# Patient Record
Sex: MALE | Race: WHITE | NOT HISPANIC OR LATINO | Employment: FULL TIME | URBAN - METROPOLITAN AREA
[De-identification: names, ages, dates, MRNs, and addresses within clinical notes are randomized per-mention and may not be internally consistent; named-entity substitution may affect disease eponyms.]

---

## 2019-05-21 ENCOUNTER — TELEPHONE (OUTPATIENT)
Dept: INTERNAL MEDICINE | Facility: CLINIC | Age: 54
End: 2019-05-21

## 2019-05-21 RX ORDER — LOSARTAN POTASSIUM 100 MG/1
100 TABLET ORAL DAILY
Qty: 90 TABLET | Refills: 0 | Status: SHIPPED | OUTPATIENT
Start: 2019-05-21 | End: 2019-08-27 | Stop reason: SDUPTHER

## 2019-05-21 RX ORDER — LOSARTAN POTASSIUM 100 MG/1
100 TABLET ORAL DAILY
COMMUNITY
End: 2019-05-21 | Stop reason: SDUPTHER

## 2019-06-17 RX ORDER — FENOFIBRATE 145 MG/1
145 TABLET, COATED ORAL DAILY
Qty: 30 TABLET | Refills: 0 | Status: SHIPPED | OUTPATIENT
Start: 2019-06-17 | End: 2020-02-03 | Stop reason: SDUPTHER

## 2019-06-17 RX ORDER — FENOFIBRATE 145 MG/1
145 TABLET, COATED ORAL DAILY
COMMUNITY
End: 2019-06-17 | Stop reason: SDUPTHER

## 2019-06-17 RX ORDER — FENOFIBRATE 145 MG/1
145 TABLET, COATED ORAL DAILY
Qty: 90 TABLET | Refills: 1 | Status: SHIPPED | OUTPATIENT
Start: 2019-06-17 | End: 2019-06-17 | Stop reason: SDUPTHER

## 2019-07-10 RX ORDER — SIMVASTATIN 40 MG
TABLET ORAL
Qty: 90 TABLET | Refills: 0 | Status: SHIPPED | OUTPATIENT
Start: 2019-07-10 | End: 2019-10-10 | Stop reason: SDUPTHER

## 2019-07-12 ENCOUNTER — OFFICE VISIT (OUTPATIENT)
Dept: INTERNAL MEDICINE | Facility: CLINIC | Age: 54
End: 2019-07-12

## 2019-07-12 VITALS
DIASTOLIC BLOOD PRESSURE: 84 MMHG | HEIGHT: 68 IN | SYSTOLIC BLOOD PRESSURE: 114 MMHG | RESPIRATION RATE: 20 BRPM | HEART RATE: 83 BPM | WEIGHT: 238.6 LBS | OXYGEN SATURATION: 98 % | BODY MASS INDEX: 36.16 KG/M2

## 2019-07-12 DIAGNOSIS — G47.33 OBSTRUCTIVE SLEEP APNEA: ICD-10-CM

## 2019-07-12 DIAGNOSIS — Z12.5 PROSTATE CANCER SCREENING: ICD-10-CM

## 2019-07-12 DIAGNOSIS — E78.00 HYPERCHOLESTEROLEMIA: ICD-10-CM

## 2019-07-12 DIAGNOSIS — R07.89 OTHER CHEST PAIN: ICD-10-CM

## 2019-07-12 DIAGNOSIS — I10 ESSENTIAL HYPERTENSION: Primary | ICD-10-CM

## 2019-07-12 DIAGNOSIS — R73.9 HYPERGLYCEMIA: ICD-10-CM

## 2019-07-12 PROCEDURE — 93000 ELECTROCARDIOGRAM COMPLETE: CPT | Performed by: INTERNAL MEDICINE

## 2019-07-12 PROCEDURE — 99214 OFFICE O/P EST MOD 30 MIN: CPT | Performed by: INTERNAL MEDICINE

## 2019-07-12 RX ORDER — OMEPRAZOLE 20 MG/1
20 CAPSULE, DELAYED RELEASE ORAL DAILY
COMMUNITY
End: 2019-12-10 | Stop reason: SDUPTHER

## 2019-07-12 RX ORDER — CHLORAL HYDRATE 500 MG
CAPSULE ORAL
COMMUNITY

## 2019-07-12 NOTE — PROGRESS NOTES
Subjective        Chief Complaint   Patient presents with   • Follow-up     6 month fup   • Hyperlipidemia   • Hypertension           Kings Mera is a 53 y.o. male who presents for    Patient Active Problem List   Diagnosis   • Hypertension   • Obstructive sleep apnea   • Hypercholesterolemia   • Hyperglycemia   • Other chest pain       History of Present Illness     He exercises 3 miles 3 days and 6 miles the other 3. He will have muscle soreness in his upper chest after he exercises. He feels fine the next day. He has dyspnea that is improving. He is not wheezing or coughing. His BP has been 136//88. His weight fluctuates. He uses a CPAP machine nightly. He had an EGD earlier this year and was told to have it repeated in 3 years.   No Known Allergies    Current Outpatient Medications on File Prior to Visit   Medication Sig Dispense Refill   • fenofibrate (TRICOR) 145 MG tablet Take 1 tablet by mouth Daily. 30 tablet 0   • losartan (COZAAR) 100 MG tablet Take 1 tablet by mouth Daily. 90 tablet 0   • Omega-3 Fatty Acids (FISH OIL) 1000 MG capsule capsule Take  by mouth Daily With Breakfast.     • omeprazole (priLOSEC) 20 MG capsule Take 20 mg by mouth Daily.     • simvastatin (ZOCOR) 40 MG tablet TAKE ONE TABLET BY MOUTH DAILY 90 tablet 0     No current facility-administered medications on file prior to visit.        Past Medical History:   Diagnosis Date   • Alopecia areata    • Back esophagus    • Diverticulitis    • Fatty liver    • Gastroesophageal reflux    • Hypercholesterolemia    • Hyperglycemia    • Hypertension    • Obesity    • Obstructive sleep apnea    • Tinea versicolor    • Tinnitus        Past Surgical History:   Procedure Laterality Date   • COLONOSCOPY  2015   • UPPER GASTROINTESTINAL ENDOSCOPY  2019       Family History   Problem Relation Age of Onset   • No Known Problems Mother    • Other Father         head injury       Social History     Socioeconomic History   • Marital status:  "     Spouse name: Not on file   • Number of children: Not on file   • Years of education: Not on file   • Highest education level: Not on file   Tobacco Use   • Smoking status: Former Smoker     Packs/day: 1.00     Years: 4.00     Pack years: 4.00     Types: Cigarettes     Last attempt to quit:      Years since quittin.5   • Smokeless tobacco: Never Used   Substance and Sexual Activity   • Alcohol use: Yes     Frequency: 2-3 times a week     Drinks per session: 5 or 6     Comment: Social - 12-18 beer per week   • Drug use: No   • Sexual activity: Defer           The following portions of the patient's history were reviewed and updated as appropriate: problem list, allergies, current medications, past medical history, past family history, past social history and past surgical history.    Review of Systems   Respiratory: Negative for shortness of breath.    Cardiovascular: Positive for chest pain.       Immunization History   Administered Date(s) Administered   • Hepatitis A 11/10/2017, 2019   • Hepatitis B 11/10/2017, 2017, 2019   • Tdap 2010   • Zostavax 2014       Objective   Vitals:    19 1049   BP: 114/84   Pulse: 83   Resp: 20   SpO2: 98%   Weight: 108 kg (238 lb 9.6 oz)   Height: 172.7 cm (68\")     Physical Exam   Constitutional: He appears well-developed and well-nourished.   HENT:   Head: Normocephalic and atraumatic.   Cardiovascular: Normal rate, regular rhythm, S1 normal, S2 normal and normal heart sounds.   Pulmonary/Chest: Effort normal and breath sounds normal.   Neurological: He is alert.   Skin: Skin is warm.   Psychiatric: He has a normal mood and affect.   Vitals reviewed.        ECG 12 Lead  Date/Time: 2019 11:35 AM  Performed by: Moncho Gonzalez MD  Authorized by: Moncho Gonzalez MD   Comparison: not compared with previous ECG   Rhythm: sinus rhythm  Rate: normal  Conduction: conduction normal  ST Segments: ST segments normal  QRS " axis: normal    Clinical impression: normal ECG            Assessment/Plan   Kings was seen today for follow-up, hyperlipidemia and hypertension.    Diagnoses and all orders for this visit:    Essential hypertension  -     Comprehensive Metabolic Panel; Future  -     CBC & Differential; Future    Hypercholesterolemia  -     Lipid Panel With / Chol / HDL Ratio; Future    Obstructive sleep apnea    Hyperglycemia  -     Hemoglobin A1c; Future    Other chest pain    Prostate cancer screening  -     PSA Screen; Future    Other orders  -     ECG 12 Lead      EKG is normal. He wants to wait on a stress test that I offered to set up. BP is good. Order given for new CPAP machine.            Return in about 4 months (around 11/12/2019).

## 2019-08-27 RX ORDER — LOSARTAN POTASSIUM 100 MG/1
100 TABLET ORAL DAILY
Qty: 90 TABLET | Refills: 1 | Status: SHIPPED | OUTPATIENT
Start: 2019-08-27 | End: 2020-02-03 | Stop reason: SDUPTHER

## 2019-10-10 RX ORDER — SIMVASTATIN 40 MG
TABLET ORAL
Qty: 90 TABLET | Refills: 0 | Status: SHIPPED | OUTPATIENT
Start: 2019-10-10 | End: 2020-01-08

## 2019-11-07 ENCOUNTER — RESULTS ENCOUNTER (OUTPATIENT)
Dept: INTERNAL MEDICINE | Facility: CLINIC | Age: 54
End: 2019-11-07

## 2019-11-07 DIAGNOSIS — I10 ESSENTIAL HYPERTENSION: ICD-10-CM

## 2019-11-07 DIAGNOSIS — E78.00 HYPERCHOLESTEROLEMIA: ICD-10-CM

## 2019-11-07 DIAGNOSIS — Z12.5 PROSTATE CANCER SCREENING: ICD-10-CM

## 2019-11-07 DIAGNOSIS — R73.9 HYPERGLYCEMIA: ICD-10-CM

## 2019-11-22 ENCOUNTER — OFFICE VISIT (OUTPATIENT)
Dept: INTERNAL MEDICINE | Facility: CLINIC | Age: 54
End: 2019-11-22

## 2019-11-22 VITALS
SYSTOLIC BLOOD PRESSURE: 180 MMHG | DIASTOLIC BLOOD PRESSURE: 90 MMHG | BODY MASS INDEX: 35.84 KG/M2 | HEIGHT: 69 IN | WEIGHT: 242 LBS

## 2019-11-22 DIAGNOSIS — I10 ESSENTIAL HYPERTENSION: ICD-10-CM

## 2019-11-22 DIAGNOSIS — R20.2 NUMBNESS AND TINGLING IN LEFT HAND: ICD-10-CM

## 2019-11-22 DIAGNOSIS — R20.0 NUMBNESS AND TINGLING IN LEFT HAND: ICD-10-CM

## 2019-11-22 DIAGNOSIS — G47.33 OBSTRUCTIVE SLEEP APNEA: Primary | ICD-10-CM

## 2019-11-22 DIAGNOSIS — E78.00 HYPERCHOLESTEROLEMIA: ICD-10-CM

## 2019-11-22 LAB
ALBUMIN SERPL-MCNC: 4.9 G/DL (ref 3.5–5.2)
ALBUMIN/GLOB SERPL: 2 G/DL
ALP SERPL-CCNC: 41 U/L (ref 39–117)
ALT SERPL-CCNC: 43 U/L (ref 1–41)
AST SERPL-CCNC: 41 U/L (ref 1–40)
BASOPHILS # BLD AUTO: 0.06 10*3/MM3 (ref 0–0.2)
BASOPHILS NFR BLD AUTO: 0.9 % (ref 0–1.5)
BILIRUB SERPL-MCNC: 0.7 MG/DL (ref 0.2–1.2)
BUN SERPL-MCNC: 15 MG/DL (ref 6–20)
BUN/CREAT SERPL: 14.4 (ref 7–25)
CALCIUM SERPL-MCNC: 9.4 MG/DL (ref 8.6–10.5)
CHLORIDE SERPL-SCNC: 102 MMOL/L (ref 98–107)
CHOLEST SERPL-MCNC: 179 MG/DL (ref 0–200)
CHOLEST/HDLC SERPL: 4.07 {RATIO}
CO2 SERPL-SCNC: 27 MMOL/L (ref 22–29)
CREAT SERPL-MCNC: 1.04 MG/DL (ref 0.76–1.27)
EOSINOPHIL # BLD AUTO: 0.18 10*3/MM3 (ref 0–0.4)
EOSINOPHIL NFR BLD AUTO: 2.8 % (ref 0.3–6.2)
ERYTHROCYTE [DISTWIDTH] IN BLOOD BY AUTOMATED COUNT: 11.7 % (ref 12.3–15.4)
GLOBULIN SER CALC-MCNC: 2.5 GM/DL
GLUCOSE SERPL-MCNC: 103 MG/DL (ref 65–99)
HBA1C MFR BLD: 5.6 % (ref 4.8–5.6)
HCT VFR BLD AUTO: 42.6 % (ref 37.5–51)
HDLC SERPL-MCNC: 44 MG/DL (ref 40–60)
HGB BLD-MCNC: 14.7 G/DL (ref 13–17.7)
IMM GRANULOCYTES # BLD AUTO: 0.02 10*3/MM3 (ref 0–0.05)
IMM GRANULOCYTES NFR BLD AUTO: 0.3 % (ref 0–0.5)
LDLC SERPL CALC-MCNC: 112 MG/DL (ref 0–100)
LYMPHOCYTES # BLD AUTO: 2.11 10*3/MM3 (ref 0.7–3.1)
LYMPHOCYTES NFR BLD AUTO: 32.7 % (ref 19.6–45.3)
MCH RBC QN AUTO: 30 PG (ref 26.6–33)
MCHC RBC AUTO-ENTMCNC: 34.5 G/DL (ref 31.5–35.7)
MCV RBC AUTO: 86.9 FL (ref 79–97)
MONOCYTES # BLD AUTO: 0.6 10*3/MM3 (ref 0.1–0.9)
MONOCYTES NFR BLD AUTO: 9.3 % (ref 5–12)
NEUTROPHILS # BLD AUTO: 3.49 10*3/MM3 (ref 1.7–7)
NEUTROPHILS NFR BLD AUTO: 54 % (ref 42.7–76)
NRBC BLD AUTO-RTO: 0 /100 WBC (ref 0–0.2)
PLATELET # BLD AUTO: 271 10*3/MM3 (ref 140–450)
POTASSIUM SERPL-SCNC: 4.3 MMOL/L (ref 3.5–5.2)
PROT SERPL-MCNC: 7.4 G/DL (ref 6–8.5)
PSA SERPL-MCNC: 0.55 NG/ML (ref 0–4)
RBC # BLD AUTO: 4.9 10*6/MM3 (ref 4.14–5.8)
SODIUM SERPL-SCNC: 144 MMOL/L (ref 136–145)
TRIGL SERPL-MCNC: 117 MG/DL (ref 0–150)
VLDLC SERPL CALC-MCNC: 23.4 MG/DL
WBC # BLD AUTO: 6.46 10*3/MM3 (ref 3.4–10.8)

## 2019-11-22 PROCEDURE — 99214 OFFICE O/P EST MOD 30 MIN: CPT | Performed by: INTERNAL MEDICINE

## 2019-11-22 RX ORDER — AMLODIPINE BESYLATE 5 MG/1
5 TABLET ORAL DAILY
Qty: 30 TABLET | Refills: 2 | Status: SHIPPED | OUTPATIENT
Start: 2019-11-22 | End: 2020-02-03

## 2019-11-22 NOTE — PROGRESS NOTES
Subjective        Chief Complaint   Patient presents with   • Hyperlipidemia     4 month fup htn tingling in arms c/o diverticulitis    • Hypertension           Kings Mera is a 53 y.o. male who presents for    Patient Active Problem List   Diagnosis   • Hypertension   • Obstructive sleep apnea   • Hypercholesterolemia   • Hyperglycemia   • Numbness and tingling in left hand       History of Present Illness     He checks his BP and it has been up to 152/80. He ate a bunch of peanuts in the end of the summer and he hurt in his lower abdomen for 2 days. He denies melena or hematochezia. As long as he takes [employer] then he does not have pain. He has tingling in his left shoulder and left 4th and 5th fingers for the last 2 months. It is worse if he puts the arm on an arm rest. He is not having neck pain. He denies chest pain or dyspnea. He uses his CPAP nightly; his machine is old. He has been drinking a case of beer per week with stress from work. He is not depressed; he cannot sleep b/c he thinks about work.  No Known Allergies    Current Outpatient Medications on File Prior to Visit   Medication Sig Dispense Refill   • fenofibrate (TRICOR) 145 MG tablet Take 1 tablet by mouth Daily. 30 tablet 0   • losartan (COZAAR) 100 MG tablet Take 1 tablet by mouth Daily. 90 tablet 1   • Omega-3 Fatty Acids (FISH OIL) 1000 MG capsule capsule Take  by mouth Daily With Breakfast.     • omeprazole (priLOSEC) 20 MG capsule Take 20 mg by mouth Daily.     • simvastatin (ZOCOR) 40 MG tablet TAKE ONE TABLET BY MOUTH DAILY 90 tablet 0     No current facility-administered medications on file prior to visit.        Past Medical History:   Diagnosis Date   • Alopecia areata    • Back esophagus    • Diverticulitis    • Fatty liver    • Gastroesophageal reflux    • Hypercholesterolemia    • Hyperglycemia    • Hypertension    • Obesity    • Obstructive sleep apnea    • Tinea versicolor    • Tinnitus        Past Surgical History:  "  Procedure Laterality Date   • COLONOSCOPY     • UPPER GASTROINTESTINAL ENDOSCOPY  2019       Family History   Problem Relation Age of Onset   • No Known Problems Mother    • Other Father         head injury       Social History     Socioeconomic History   • Marital status:      Spouse name: Not on file   • Number of children: Not on file   • Years of education: Not on file   • Highest education level: Not on file   Tobacco Use   • Smoking status: Former Smoker     Packs/day: 1.00     Years: 4.00     Pack years: 4.00     Types: Cigarettes     Last attempt to quit: 1990     Years since quittin.9   • Smokeless tobacco: Never Used   Substance and Sexual Activity   • Alcohol use: Yes     Frequency: 2-3 times a week     Drinks per session: 5 or 6     Comment: Social - 12-18 beer per week   • Drug use: No   • Sexual activity: Defer           The following portions of the patient's history were reviewed and updated as appropriate: problem list, allergies, current medications, past medical history, past family history, past social history and past surgical history.    Review of Systems   Respiratory: Negative for shortness of breath.    Cardiovascular: Negative for chest pain.       Immunization History   Administered Date(s) Administered   • Hepatitis A 11/10/2017, 2019   • Hepatitis B 11/10/2017, 2017, 2019   • Tdap 2010   • Zostavax 2014       Objective   Vitals:    19 0909   BP: 180/90   Weight: 110 kg (242 lb)   Height: 175.3 cm (69\")     Body mass index is 35.74 kg/m².  Physical Exam   Constitutional: He appears well-developed and well-nourished.   HENT:   Head: Normocephalic and atraumatic.   Cardiovascular: Normal rate, regular rhythm, S1 normal, S2 normal and normal heart sounds.   Pulmonary/Chest: Effort normal and breath sounds normal.   Neurological: He is alert.   Reflex Scores:       Bicep reflexes are 2+ on the right side and 2+ on the left side.       " Brachioradialis reflexes are 2+ on the right side and 2+ on the left side.  5/5 strength in arms    Left shoulder good ROM   Skin: Skin is warm.   Psychiatric: He has a normal mood and affect.   Vitals reviewed.      Procedures    Assessment/Plan   Kings was seen today for hyperlipidemia and hypertension.    Diagnoses and all orders for this visit:    Obstructive sleep apnea  -     Ambulatory Referral to Sleep Medicine    Essential hypertension  -     amLODIPine (NORVASC) 5 MG tablet; Take 1 tablet by mouth Daily.    Numbness and tingling in left hand  -     Ambulatory Referral to Physical Therapy Evaluate and treat    Hypercholesterolemia               Labs today including cmp, flp, and psa. Add Norvasc for HTN; discussed risk of muscle aches with Zocor. Set up PT for left arm; sounds like potential pinched nerve. Discussed melatonin for sleep and decreasing beer. Get in with sleep medicine b/c his machine is old and he has not seen a sleep doctor recently.  Return in about 6 weeks (around 1/3/2020).

## 2019-12-06 ENCOUNTER — TREATMENT (OUTPATIENT)
Dept: PHYSICAL THERAPY | Facility: CLINIC | Age: 54
End: 2019-12-06

## 2019-12-06 DIAGNOSIS — M54.2 PAIN, NECK: Primary | ICD-10-CM

## 2019-12-06 DIAGNOSIS — M54.12 RADICULOPATHY, CERVICAL: ICD-10-CM

## 2019-12-06 PROCEDURE — 97162 PT EVAL MOD COMPLEX 30 MIN: CPT | Performed by: PHYSICAL THERAPIST

## 2019-12-06 PROCEDURE — 97110 THERAPEUTIC EXERCISES: CPT | Performed by: PHYSICAL THERAPIST

## 2019-12-06 NOTE — PROGRESS NOTES
Physical Therapy Initial Evaluation and Plan of Care    Patient: Kings Mera   : 1965  Diagnosis/ICD-10 Code:  Pain, neck [M54.2]  Referring practitioner: Moncho Gonzalez MD  Past Medical History Reviewed: 2019    PLOF: Independent and working full time as     Subjective Evaluation    History of Present Illness  Date of onset: 10/24/2019  Mechanism of injury: I am having some tingling in the left shoulder and the last 2 fingers. I do not have any neck pain. I woke up one morning and my hand was tingling and then my shoulder tinglings too. Lying on the right side makes my left arm go numb and putting the arm behind my back makes it better. Riding my bike makes it tingle more. I like to golf, but that does not bother it   Have not tried heat or ice. No pain is associated with this just reaching up makes it tingle.  I feel like it is more the position of my arm than my neck.   I have never had this happen before.            Patient Occupation:  Pain  No pain reported  Alleviating factors: putting shoulder behind back.  Aggravating factors: outstretched reach (at rest)  Progression: no change    Hand dominance: right    Diagnostic Tests  No diagnostic tests performed             Objective       Palpation   Left   Tenderness of the cervical paraspinals, middle trapezius, pectoralis minor and suboccipitals.     Active Range of Motion   Cervical/Thoracic Spine   Cervical    Flexion: WFL  Extension: WFL and with pain  Left lateral flexion: WFL  Right lateral flexion: WFL and with pain  Left rotation: 65 degrees   Right rotation: 70 degrees     Strength/Myotome Testing   Cervical Spine     Left   Normal strength    Right   Normal strength    Tests   Cervical     Left   Positive Spurling's sign.          Assessment & Plan     Assessment  Impairments: activity intolerance and pain with function  Assessment details: Pt presents to PT with symptoms consistent with mild cervical  radiculopathy, cervical tightness and postural deficits.  Pt would benefit from skilled PT intervention to address the deficits noted.     Prognosis: good  Functional Limitations: uncomfortable because of pain, sitting, reaching overhead and unable to perform repetitive tasks  Goals  Plan Goals: SHORT TERM GOALS: 4-6 visits  1. Pt will be compliant with HEP.  2. Pt to exhibit 75 degrees of cervical rotation to allow for viewing traffic without pain or limitations  3. Pt to report ability to sleep through the night without awakening  4. Pt will have minimal-no reported radicular sx's into LUE    LONG TERM GOALS: 8-10 visits  1.  Pt to score <10% perceived disability on QuickDASH  2.  Pain level < 3/10 at worse with driving > 60 min. and ADL's  3.  Pt will report minimal-no pain with palpation of cervical and thoracic musculature  4.  Pt able to job requirements and cleaning  activities without complaints of pain limiting function.     Plan  Therapy options: will be seen for skilled physical therapy services  Planned modality interventions: cryotherapy, electrical stimulation/Russian stimulation, iontophoresis, TENS, thermotherapy (hydrocollator packs), traction and ultrasound  Other planned modality interventions: Dry Needling  Planned therapy interventions: abdominal trunk stabilization, ADL retraining, body mechanics training, flexibility, functional ROM exercises, home exercise program, joint mobilization, manual therapy, neuromuscular re-education, postural training, soft tissue mobilization, spinal/joint mobilization, strengthening, stretching and therapeutic activities  Duration in visits: 10  Treatment plan discussed with: patient        Manual Therapy:    -     mins  63902;  Therapeutic Exercise:    10     mins  88320;     Neuromuscular Gemma:    -    mins  44937;    Therapeutic Activity:     -     mins  90142;     Gait Training:      -     mins  12029;     Ultrasound:     -     mins  13698;    Electrical  Stimulation:    -     mins  79269 ( );  Dry Needling     -     mins self-pay    Timed Treatment:   10   mins   Total Treatment:     60   mins      PT SIGNATURE: Silvina Christian, PT   DATE TREATMENT INITIATED: 12/6/2019    Initial Certification  Certification Period: 3/5/2020  I certify that the therapy services are furnished while this patient is under my care.  The services outlined above are required by this patient, and will be reviewed every 90 days.     PHYSICIAN: Moncho Gonzalez MD      DATE:     Please sign and return via fax to [phone].. Thank you, [facility].

## 2019-12-10 RX ORDER — OMEPRAZOLE 20 MG/1
CAPSULE, DELAYED RELEASE ORAL
Qty: 30 CAPSULE | Refills: 9 | Status: SHIPPED | OUTPATIENT
Start: 2019-12-10 | End: 2020-10-12

## 2019-12-11 ENCOUNTER — TREATMENT (OUTPATIENT)
Dept: PHYSICAL THERAPY | Facility: CLINIC | Age: 54
End: 2019-12-11

## 2019-12-11 DIAGNOSIS — M54.2 PAIN, NECK: Primary | ICD-10-CM

## 2019-12-11 DIAGNOSIS — M54.12 RADICULOPATHY, CERVICAL: ICD-10-CM

## 2019-12-11 PROCEDURE — 97035 APP MDLTY 1+ULTRASOUND EA 15: CPT | Performed by: PHYSICAL THERAPIST

## 2019-12-11 PROCEDURE — 97110 THERAPEUTIC EXERCISES: CPT | Performed by: PHYSICAL THERAPIST

## 2019-12-11 NOTE — PROGRESS NOTES
Physical Therapy Daily Progress Note  Visit: 2    Kings Mera reports: I feel about the same, maybe a little different    Subjective     Objective   See Exercise, Manual, and Modality Logs for complete treatment.       Assessment & Plan     Assessment  Assessment details: Pt responded very well to treatment today. After ultrasound he did not have any radicular sx's with exercise. Added rows, supine thoracic extension and doorway stretch to HEP    Plan  Plan details: Add shoulder extension and chin tucks with ball next session        Manual Therapy:    4     mins  46018;  Therapeutic Exercise:    31     mins  39591;     Neuromuscular Gemma:    -    mins  58704;    Therapeutic Activity:     -     mins  12451;     Gait Training:      -     mins  01041;     Ultrasound:     10     mins  72006;    Electrical Stimulation:    -     mins  20040 ( );  Dry Needling     -     mins self-pay    Timed Treatment:   45   mins   Total Treatment:     45   mins    Silvina Christian PT  [place] License #: [id number]    Physical Therapist

## 2019-12-18 ENCOUNTER — TREATMENT (OUTPATIENT)
Dept: PHYSICAL THERAPY | Facility: CLINIC | Age: 54
End: 2019-12-18

## 2019-12-18 DIAGNOSIS — M54.12 RADICULOPATHY, CERVICAL: ICD-10-CM

## 2019-12-18 DIAGNOSIS — M54.2 PAIN, NECK: Primary | ICD-10-CM

## 2019-12-18 PROCEDURE — 97035 APP MDLTY 1+ULTRASOUND EA 15: CPT | Performed by: PHYSICAL THERAPIST

## 2019-12-18 PROCEDURE — 97110 THERAPEUTIC EXERCISES: CPT | Performed by: PHYSICAL THERAPIST

## 2019-12-18 NOTE — PROGRESS NOTES
Physical Therapy Daily Progress Note  Visit: 3    Kings Mera reports: I am actually doing a lot better    Subjective     Objective   See Exercise, Manual, and Modality Logs for complete treatment.       Assessment & Plan     Assessment  Assessment details: Pt doing well. No reported radicular pain with exercise. Educated to continue stretching at home    Plan  Plan details: Progress per POC        Manual Therapy:    5     mins  15250;  Therapeutic Exercise:    30     mins  88955;     Neuromuscular Gemma:    -    mins  99153;    Therapeutic Activity:     -     mins  65105;     Gait Training:      -     mins  59471;     Ultrasound:     10     mins  19161;    Electrical Stimulation:    -     mins  79893 ( );  Dry Needling     -     mins self-pay    Timed Treatment:   45   mins   Total Treatment:     48   mins    Silvina Christian PT  [place] License #: [id number]    Physical Therapist

## 2019-12-19 ENCOUNTER — OFFICE VISIT (OUTPATIENT)
Dept: SLEEP MEDICINE | Facility: HOSPITAL | Age: 54
End: 2019-12-19

## 2019-12-19 VITALS
WEIGHT: 239 LBS | BODY MASS INDEX: 35.4 KG/M2 | SYSTOLIC BLOOD PRESSURE: 163 MMHG | OXYGEN SATURATION: 97 % | DIASTOLIC BLOOD PRESSURE: 88 MMHG | HEIGHT: 69 IN | HEART RATE: 92 BPM

## 2019-12-19 DIAGNOSIS — G47.33 OBSTRUCTIVE SLEEP APNEA: Primary | ICD-10-CM

## 2019-12-19 DIAGNOSIS — E66.09 CLASS 2 OBESITY DUE TO EXCESS CALORIES WITHOUT SERIOUS COMORBIDITY WITH BODY MASS INDEX (BMI) OF 36.0 TO 36.9 IN ADULT: ICD-10-CM

## 2019-12-19 DIAGNOSIS — R06.83 SNORING: ICD-10-CM

## 2019-12-19 PROBLEM — E66.812 CLASS 2 OBESITY DUE TO EXCESS CALORIES WITHOUT SERIOUS COMORBIDITY WITH BODY MASS INDEX (BMI) OF 36.0 TO 36.9 IN ADULT: Status: ACTIVE | Noted: 2019-12-19

## 2019-12-19 PROCEDURE — G0463 HOSPITAL OUTPT CLINIC VISIT: HCPCS

## 2019-12-19 PROCEDURE — 99244 OFF/OP CNSLTJ NEW/EST MOD 40: CPT | Performed by: INTERNAL MEDICINE

## 2019-12-19 NOTE — PROGRESS NOTES
[facility]  [address]  [address] [place] [address]  Phone   Fax       Kings Mera  1965  53 y.o.  male      Referring Provider, and PCP:Moncho Gonzalez MD    Type of service: Initial consult  Date of service: 12/19/2019      Chief Complaint   Patient presents with   • Sleep Apnea   • Snoring   • Fatigue       History of present illness;  Thank you for asking to see Kings Mera, 53 y.o.  for evaluation of sleep apnea. The patient was seen today on 12/19/2019 at [facility].   Patient has a history of sleep apnea has been using CPAP for the past more than 10 years but he is CPAP is very old and has no smartcard.  He also has put on about 20 pounds in weight.  His symptoms are getting worse and he says that he is still snores and has daytime excessive sleepiness.  It used to help him in the past and now in the past 6 months his symptoms are getting worse    Patient gives the following sleep history.  Sleep schedule:  Bedtime: 10 PM  Wake time: 5 AM  Normally takes about 30 minutes to fall asleep  Average hours of sleep 6  Number of naps per day no  When patient wakes up still feels tired and not rested  Snoring; yes  Witnessed apneas; yes  Have you ever awakened gasping for breath, coughing, choking: Yes      Past Medical History:   Diagnosis Date   • Alopecia areata    • Back esophagus    • Diverticulitis    • Fatty liver    • Gastroesophageal reflux    • Hypercholesterolemia    • Hyperglycemia    • Hypertension    • Obesity    • Obstructive sleep apnea    • Tinea versicolor    • Tinnitus        Social history:  Shift work: No  Tobacco use: Smoker  Alcohol use: 1-2 drinks a day  Caffeinated drinks: 1 cup of coffee  Over-the-counter sleeping aid and medications: No  Narcotic medications: No    Family Hx  Family history of sleep apnea, no family history of sleep apnea  Family History   Problem Relation Age of Onset   • No Known  "Problems Mother    • Other Father         head injury       Medications: reviewed    Current Outpatient Medications:   •  amLODIPine (NORVASC) 5 MG tablet, Take 1 tablet by mouth Daily., Disp: 30 tablet, Rfl: 2  •  fenofibrate (TRICOR) 145 MG tablet, Take 1 tablet by mouth Daily., Disp: 30 tablet, Rfl: 0  •  losartan (COZAAR) 100 MG tablet, Take 1 tablet by mouth Daily., Disp: 90 tablet, Rfl: 1  •  Omega-3 Fatty Acids (FISH OIL) 1000 MG capsule capsule, Take  by mouth Daily With Breakfast., Disp: , Rfl:   •  omeprazole (priLOSEC) 20 MG capsule, TAKE ONE CAPSULE BY MOUTH DAILY, Disp: 30 capsule, Rfl: 9  •  simvastatin (ZOCOR) 40 MG tablet, TAKE ONE TABLET BY MOUTH DAILY, Disp: 90 tablet, Rfl: 0    Review of systems:  [place] Sleepiness Scale: Total score: 2   Positive for snoring, witnessed apneas, fatigue.,   Negative for shortness of breath, cough, wheezing, chest pain, nausea and vomiting, palpitation, swelling of feet:    Morning headaches: No  Awaken with sore throat or dry mouth : No  Leg jerking at night: No  Crawly feeling/urge sensation to move in the legs: No  Teeth grinding: No  Sleepwalking, nightmares, muscle weakness with laughing or anger,sleep paralysis: No  Nasal Congestion: No  Nocturia (how many times/night): 2  Memory Problem: No  Change in weight, gained about 20 pounds    Physical exam:  Vitals:    12/19/19 1306   BP: 163/88   Pulse: 92   SpO2: 97%   Weight: 108 kg (239 lb)   Height: 175.3 cm (69\")    Body mass index is 35.29 kg/m². Neck Circumference: 19 inches  HEENT: Head is atraumatic, normocephalic   Eyes:pupils are round equal and reacting to light and accommodation, conjunctiva normal  Nose:no nasal septal defects or deviation and the nasal passages are clear, no nasal polyps,   Throat: tonsils are not enlarged, tongue normal size, oral airway Mallampati class 3  NECK: No lymphadenopathy, trachea is in the midline, thyroid not enlarged  RESPIRATORY SYSTEM: Breath sounds are equal on both " sides, there are no wheezes or crackles  CARDIOVASULAR SYSTEM: Heart sounds are regular rhythm and dmitri rate, there are no murmurs or thrills  ABDOMEN: Soft, no hepatosplenomegaly, no evidence of ascites  EXTREMITES: No cyanosis, clubbing or edema   NEUROLOGICAL SYSTEM: Oriented x 3, no gross neurological defects, gait normal    Medical records and labs reviewed in Epic thyroid normal    Assessment and plan:  · Sleep apnea obstructive, (G47.33) patient has a history of sleep apnea has been on CPAP.  Now the patient says that his CPAP is not helping him and a he has no smartcard to download.  In addition he thinks that the CPAP is not working.  He has also gained 20 pounds in weight.  I have talked to the patient about sleep apnea signs and symptoms, pathophysiology and consequences of untreated sleep apnea.  We will proceed with a home sleep test to reevaluate his sleep apnea and treat. I have placed a order in epic for a home sleep test.  Patient will have a follow-up after this sleep test is done.   · Obesity, patient's BMI is Body mass index is 35.29 kg/m².. I have discussed the relationship between weight and sleep apnea.There is direct correction between weight and severity of sleep apnea.  Weight reduction is encouraged. I have also discussed with the patient diet and exercise.  · Snoring secondary to sleep apnea      I once again thank you for asking me to see this patient in consultation and I have forwarded my opinion and treatment plan.  Please do not hesitate to call me if you have any questions.     Lg Estrada MD, [facility]  Sleep Medicine.(Board-certified)  [facility]  12/19/2019 ,

## 2019-12-27 ENCOUNTER — TREATMENT (OUTPATIENT)
Dept: PHYSICAL THERAPY | Facility: CLINIC | Age: 54
End: 2019-12-27

## 2019-12-27 DIAGNOSIS — M54.12 RADICULOPATHY, CERVICAL: ICD-10-CM

## 2019-12-27 DIAGNOSIS — M54.2 PAIN, NECK: Primary | ICD-10-CM

## 2019-12-27 PROCEDURE — 97110 THERAPEUTIC EXERCISES: CPT | Performed by: PHYSICAL THERAPIST

## 2019-12-27 PROCEDURE — 97035 APP MDLTY 1+ULTRASOUND EA 15: CPT | Performed by: PHYSICAL THERAPIST

## 2019-12-27 NOTE — PROGRESS NOTES
Physical Therapy Daily Progress Note  Visit: 4    Kings Mera reports: I feel about the same this week    Subjective     Objective   See Exercise, Manual, and Modality Logs for complete treatment.       Assessment & Plan     Assessment  Assessment details: Able to progress with stabilization exercises in gym today. Did report some radicular sx's with progressing on SB, but otherwise was able to progress without increased pain    Plan  Plan details: Progress as able        Manual Therapy:    4     mins  76038;  Therapeutic Exercise:    40     mins  13982;     Neuromuscular Gemma:    -    mins  92102;    Therapeutic Activity:     -     mins  52598;     Gait Training:      -     mins  25154;     Ultrasound:     10     mins  27021;    Electrical Stimulation:    -     mins  82230 ( );  Dry Needling     -     mins self-pay    Timed Treatment:   54   mins   Total Treatment:     55   mins    LOR[place] Child License #: [id number]    Physical Therapist

## 2019-12-30 ENCOUNTER — TREATMENT (OUTPATIENT)
Dept: PHYSICAL THERAPY | Facility: CLINIC | Age: 54
End: 2019-12-30

## 2019-12-30 DIAGNOSIS — M54.12 RADICULOPATHY, CERVICAL: ICD-10-CM

## 2019-12-30 DIAGNOSIS — M54.2 PAIN, NECK: Primary | ICD-10-CM

## 2019-12-30 PROCEDURE — 97035 APP MDLTY 1+ULTRASOUND EA 15: CPT | Performed by: PHYSICAL THERAPIST

## 2019-12-30 PROCEDURE — 97110 THERAPEUTIC EXERCISES: CPT | Performed by: PHYSICAL THERAPIST

## 2019-12-30 NOTE — PROGRESS NOTES
Physical Therapy Daily Progress Note  Visit: 5    Kings Samaria reports: The neck is definitely getting better. I can feel it getting less tight    Subjective     Objective   See Exercise, Manual, and Modality Logs for complete treatment.       Assessment & Plan     Assessment  Assessment details: Pt is progressing very well. He did not report any radicular sx's with SB push ups, which did cause pain last visit. Added prone T,Y,I to HEP    Plan  Plan details: Progress per POC        Manual Therapy:    3     mins  44018;  Therapeutic Exercise:    38     mins  81380;     Neuromuscular Gemma:    -    mins  32155;    Therapeutic Activity:     -     mins  31548;     Gait Training:      -     mins  76064;     Ultrasound:     10     mins  52228;    Electrical Stimulation:    -     mins  34786 ( );  Dry Needling     -     mins self-pay    Timed Treatment:   51   mins   Total Treatment:     53   mins    Silvina Christian PT  [place] License #: [id number]    Physical Therapist

## 2020-01-03 ENCOUNTER — TREATMENT (OUTPATIENT)
Dept: PHYSICAL THERAPY | Facility: CLINIC | Age: 55
End: 2020-01-03

## 2020-01-03 DIAGNOSIS — M54.12 RADICULOPATHY, CERVICAL: ICD-10-CM

## 2020-01-03 DIAGNOSIS — M54.2 PAIN, NECK: Primary | ICD-10-CM

## 2020-01-03 PROCEDURE — 97110 THERAPEUTIC EXERCISES: CPT | Performed by: PHYSICAL THERAPIST

## 2020-01-03 PROCEDURE — 97035 APP MDLTY 1+ULTRASOUND EA 15: CPT | Performed by: PHYSICAL THERAPIST

## 2020-01-03 NOTE — PROGRESS NOTES
Physical Therapy Daily Progress Note  Visit: 6    Kings Mera reports: Neck gets a little better each day    Subjective     Objective   See Exercise, Manual, and Modality Logs for complete treatment.       Assessment & Plan     Assessment  Assessment details: Pt is doing excellent, radicular sx's are subsiding and centralizing to cervical spine. Added levator scap stretch to HEP    Plan  Plan details: Progress per POC        Manual Therapy:    -     mins  54279;  Therapeutic Exercise:    40     mins  94617;     Neuromuscular Gemma:    -    mins  71379;    Therapeutic Activity:     -     mins  81249;     Gait Training:      -     mins  98205;     Ultrasound:     10     mins  57093;    Electrical Stimulation:    -     mins  03890 ( );  Dry Needling     -     mins self-pay    Timed Treatment:   50   mins   Total Treatment:     52   mins    Silvina Christian PT  [place] License #: [id number]    Physical Therapist

## 2020-01-06 ENCOUNTER — TREATMENT (OUTPATIENT)
Dept: PHYSICAL THERAPY | Facility: CLINIC | Age: 55
End: 2020-01-06

## 2020-01-06 DIAGNOSIS — M54.2 PAIN, NECK: Primary | ICD-10-CM

## 2020-01-06 DIAGNOSIS — M54.12 RADICULOPATHY, CERVICAL: ICD-10-CM

## 2020-01-06 PROCEDURE — 97140 MANUAL THERAPY 1/> REGIONS: CPT | Performed by: PHYSICAL THERAPIST

## 2020-01-06 PROCEDURE — 97035 APP MDLTY 1+ULTRASOUND EA 15: CPT | Performed by: PHYSICAL THERAPIST

## 2020-01-06 PROCEDURE — 97110 THERAPEUTIC EXERCISES: CPT | Performed by: PHYSICAL THERAPIST

## 2020-01-06 NOTE — PROGRESS NOTES
Physical Therapy Daily Progress Note  Visit: 7    Kings Mera reports: I slept on my neck wrong so it is a little stiff today    Subjective     Objective   See Exercise, Manual, and Modality Logs for complete treatment.       Assessment & Plan     Assessment  Assessment details: Focused on stretching and mobilization of cervical spine. Pt reported much relief after session    Plan  Plan details: Reassess on Friday        Manual Therapy:    8     mins  84590;  Therapeutic Exercise:    21     mins  73725;     Neuromuscular Gemma:    -    mins  21022;    Therapeutic Activity:     -     mins  92921;     Gait Training:      -     mins  42747;     Ultrasound:     10     mins  88570;    Electrical Stimulation:    -     mins  06377 ( );  Dry Needling     -     mins self-pay    Timed Treatment:   39   mins   Total Treatment:     40   mins    Silvina Christian PT  [place] License #: [id number]    Physical Therapist

## 2020-01-08 RX ORDER — SIMVASTATIN 40 MG
TABLET ORAL
Qty: 90 TABLET | Refills: 0 | Status: SHIPPED | OUTPATIENT
Start: 2020-01-08 | End: 2020-02-03

## 2020-01-10 ENCOUNTER — TREATMENT (OUTPATIENT)
Dept: PHYSICAL THERAPY | Facility: CLINIC | Age: 55
End: 2020-01-10

## 2020-01-10 DIAGNOSIS — M54.2 PAIN, NECK: Primary | ICD-10-CM

## 2020-01-10 DIAGNOSIS — M54.12 RADICULOPATHY, CERVICAL: ICD-10-CM

## 2020-01-10 PROCEDURE — 97140 MANUAL THERAPY 1/> REGIONS: CPT | Performed by: PHYSICAL THERAPIST

## 2020-01-10 PROCEDURE — 97035 APP MDLTY 1+ULTRASOUND EA 15: CPT | Performed by: PHYSICAL THERAPIST

## 2020-01-10 PROCEDURE — 97110 THERAPEUTIC EXERCISES: CPT | Performed by: PHYSICAL THERAPIST

## 2020-01-10 NOTE — PROGRESS NOTES
Physical Therapy Daily Progress Note  Visit: 8    Kings Mera reports: I would say the neck is 60% better. The area is shrinking and the radicular sx's are much better. I continue to make progress each week.     Subjective     Objective   See Exercise, Manual, and Modality Logs for complete treatment.       Assessment & Plan     Assessment  Assessment details: Pt doing very well. Continues to progress each week. Minimal-no arm pain, but slightly more (L) neck restrictions due to pain. Would like to add more cervical stabilization exercises to help support neck and continue neck rehab to further decrease his sx's    Plan  Plan details: Continue 2x/wk for 2 weeks        Manual Therapy:    9     mins  21560;  Therapeutic Exercise:    27     mins  26705;     Neuromuscular Gemma:    -    mins  57451;    Therapeutic Activity:     -     mins  40104;     Gait Training:      -     mins  67482;     Ultrasound:     10     mins  83046;    Electrical Stimulation:    -     mins  15270 ( );  Dry Needling     -     mins self-pay    Timed Treatment:   46   mins   Total Treatment:     50   mins    Silvina Christian PT  [place] License #: [id number]    Physical Therapist

## 2020-01-17 ENCOUNTER — TREATMENT (OUTPATIENT)
Dept: PHYSICAL THERAPY | Facility: CLINIC | Age: 55
End: 2020-01-17

## 2020-01-17 ENCOUNTER — HOSPITAL ENCOUNTER (OUTPATIENT)
Dept: SLEEP MEDICINE | Facility: HOSPITAL | Age: 55
Discharge: HOME | End: 2020-01-17
Admitting: INTERNAL MEDICINE

## 2020-01-17 DIAGNOSIS — G47.33 OBSTRUCTIVE SLEEP APNEA: ICD-10-CM

## 2020-01-17 DIAGNOSIS — E66.09 CLASS 2 OBESITY DUE TO EXCESS CALORIES WITHOUT SERIOUS COMORBIDITY WITH BODY MASS INDEX (BMI) OF 36.0 TO 36.9 IN ADULT: ICD-10-CM

## 2020-01-17 DIAGNOSIS — M54.2 PAIN, NECK: Primary | ICD-10-CM

## 2020-01-17 DIAGNOSIS — M54.12 RADICULOPATHY, CERVICAL: ICD-10-CM

## 2020-01-17 DIAGNOSIS — R06.83 SNORING: ICD-10-CM

## 2020-01-17 PROCEDURE — 97110 THERAPEUTIC EXERCISES: CPT | Performed by: PHYSICAL THERAPIST

## 2020-01-17 PROCEDURE — 95806 SLEEP STUDY UNATT&RESP EFFT: CPT

## 2020-01-17 PROCEDURE — 97140 MANUAL THERAPY 1/> REGIONS: CPT | Performed by: PHYSICAL THERAPIST

## 2020-01-17 PROCEDURE — 95806 SLEEP STUDY UNATT&RESP EFFT: CPT | Performed by: INTERNAL MEDICINE

## 2020-01-17 PROCEDURE — 97035 APP MDLTY 1+ULTRASOUND EA 15: CPT | Performed by: PHYSICAL THERAPIST

## 2020-01-17 NOTE — PROGRESS NOTES
Physical Therapy Daily Progress Note  Visit: 9    Kings Mera reports: I have had a really stressful week. My neck is hurting me more this week    Subjective     Objective   See Exercise, Manual, and Modality Logs for complete treatment.       Assessment & Plan     Assessment  Assessment details: Discussed some relaxation techniques to help reduce stress and thus reduce tension in his neck and shoulders. Focused on stretching today. Will resume strength and stabilization next session    Plan  Plan details: Progress as able        Manual Therapy:    8     mins  18195;  Therapeutic Exercise:    31     mins  50026;     Neuromuscular Gemma:    -    mins  16204;    Therapeutic Activity:     -     mins  19424;     Gait Training:      -     mins  11957;     Ultrasound:     10     mins  37570;    Electrical Stimulation:    -     mins  55839 ( );  Dry Needling     -     mins self-pay    Timed Treatment:   49   mins   Total Treatment:     50   mins    Silvina Christian, PT  [place] License #: [id number]    Physical Therapist

## 2020-01-24 ENCOUNTER — TREATMENT (OUTPATIENT)
Dept: PHYSICAL THERAPY | Facility: CLINIC | Age: 55
End: 2020-01-24

## 2020-01-24 DIAGNOSIS — M54.12 RADICULOPATHY, CERVICAL: ICD-10-CM

## 2020-01-24 DIAGNOSIS — M54.2 PAIN, NECK: Primary | ICD-10-CM

## 2020-01-24 PROCEDURE — 97110 THERAPEUTIC EXERCISES: CPT | Performed by: PHYSICAL THERAPIST

## 2020-01-24 PROCEDURE — 97035 APP MDLTY 1+ULTRASOUND EA 15: CPT | Performed by: PHYSICAL THERAPIST

## 2020-01-24 NOTE — PROGRESS NOTES
Physical Therapy Daily Progress Note  Visit: 10    Kings Mera reports: It has been a really busy week.    Subjective     Objective   See Exercise, Manual, and Modality Logs for complete treatment.       Assessment & Plan     Assessment  Assessment details: Pt late to appt therefore was unable to get to all of therapy exercises today. Educated to continue stretching and stabilization exercises at home    Plan  Plan details: Progress per POC        Manual Therapy:    -     mins  03967;  Therapeutic Exercise:    20     mins  01928;     Neuromuscular Gemma:    -    mins  22837;    Therapeutic Activity:     -     mins  23208;     Gait Training:      -     mins  35008;     Ultrasound:     10     mins  25867;    Electrical Stimulation:    -     mins  99615 ( );  Dry Needling     -     mins self-pay    Timed Treatment:   30   mins   Total Treatment:     32   mins    Silvina Christian, PT  [place] License #: [id number]    Physical Therapist

## 2020-01-28 ENCOUNTER — TELEPHONE (OUTPATIENT)
Dept: SLEEP MEDICINE | Facility: HOSPITAL | Age: 55
End: 2020-01-28

## 2020-01-28 NOTE — TELEPHONE ENCOUNTER
Spoke with patient about sleep study results, sending orders to [facility] ( patient was with [facility] requested different DME) follow up scheduled 3/12/20

## 2020-01-31 ENCOUNTER — TREATMENT (OUTPATIENT)
Dept: PHYSICAL THERAPY | Facility: CLINIC | Age: 55
End: 2020-01-31

## 2020-01-31 DIAGNOSIS — M54.12 RADICULOPATHY, CERVICAL: ICD-10-CM

## 2020-01-31 DIAGNOSIS — M54.2 PAIN, NECK: Primary | ICD-10-CM

## 2020-01-31 PROCEDURE — 97110 THERAPEUTIC EXERCISES: CPT | Performed by: PHYSICAL THERAPIST

## 2020-01-31 PROCEDURE — 97035 APP MDLTY 1+ULTRASOUND EA 15: CPT | Performed by: PHYSICAL THERAPIST

## 2020-01-31 NOTE — PROGRESS NOTES
Physical Therapy Daily Progress Note  Visit: 11    Kings Horneyoavs reports: I feel like I am over 80% back to normal. The neck feels better. I rarely have the pain now. I have been doing my exercises at least daily or every other day    Subjective     Objective   See Exercise, Manual, and Modality Logs for complete treatment.       Assessment & Plan     Assessment  Assessment details: Pt has made great progress in PT. His (L) sided neck pain has subsided and his radicular sx's as well. Educated to continue performing the stretching and stabilization exercises consistently to help manage his neck. Pt has no further questions or concerns regarding therapy at this time.    Plan  Plan details: DC at this time        Manual Therapy:    -     mins  82481;  Therapeutic Exercise:    24     mins  22810;     Neuromuscular Gemma:    -    mins  98525;    Therapeutic Activity:     -     mins  46095;     Gait Training:      -     mins  65121;     Ultrasound:     10     mins  92339;    Electrical Stimulation:    -     mins  10843 ( );  Dry Needling     -     mins self-pay    Timed Treatment:   34   mins   Total Treatment:     35   mins    Silvina Christian PT  [place] License #: [id number]    Physical Therapist

## 2020-02-03 ENCOUNTER — OFFICE VISIT (OUTPATIENT)
Dept: INTERNAL MEDICINE | Facility: CLINIC | Age: 55
End: 2020-02-03

## 2020-02-03 VITALS
BODY MASS INDEX: 36.62 KG/M2 | HEIGHT: 68 IN | SYSTOLIC BLOOD PRESSURE: 148 MMHG | WEIGHT: 241.6 LBS | DIASTOLIC BLOOD PRESSURE: 84 MMHG

## 2020-02-03 DIAGNOSIS — I10 ESSENTIAL HYPERTENSION: Primary | ICD-10-CM

## 2020-02-03 DIAGNOSIS — E78.00 HYPERCHOLESTEROLEMIA: ICD-10-CM

## 2020-02-03 DIAGNOSIS — I10 ESSENTIAL HYPERTENSION: ICD-10-CM

## 2020-02-03 PROCEDURE — 90471 IMMUNIZATION ADMIN: CPT | Performed by: INTERNAL MEDICINE

## 2020-02-03 PROCEDURE — 90715 TDAP VACCINE 7 YRS/> IM: CPT | Performed by: INTERNAL MEDICINE

## 2020-02-03 PROCEDURE — 99214 OFFICE O/P EST MOD 30 MIN: CPT | Performed by: INTERNAL MEDICINE

## 2020-02-03 RX ORDER — ATORVASTATIN CALCIUM 40 MG/1
40 TABLET, FILM COATED ORAL DAILY
Qty: 30 TABLET | Refills: 2 | Status: SHIPPED | OUTPATIENT
Start: 2020-02-03 | End: 2020-02-03

## 2020-02-03 RX ORDER — SIMVASTATIN 40 MG
40 TABLET ORAL NIGHTLY
COMMUNITY
End: 2020-03-20

## 2020-02-03 RX ORDER — SIMVASTATIN 40 MG
40 TABLET ORAL DAILY
Qty: 90 TABLET | Refills: 0 | Status: SHIPPED | OUTPATIENT
Start: 2020-02-03 | End: 2020-02-03 | Stop reason: CLARIF

## 2020-02-03 RX ORDER — AMLODIPINE BESYLATE 5 MG/1
5 TABLET ORAL DAILY
Qty: 30 TABLET | Refills: 2 | Status: SHIPPED | OUTPATIENT
Start: 2020-02-03 | End: 2020-03-20

## 2020-02-03 RX ORDER — AMLODIPINE BESYLATE 5 MG/1
5 TABLET ORAL DAILY
Qty: 30 TABLET | Refills: 2 | Status: SHIPPED | OUTPATIENT
Start: 2020-02-03 | End: 2020-02-03 | Stop reason: SDUPTHER

## 2020-02-03 RX ORDER — LOSARTAN POTASSIUM 100 MG/1
100 TABLET ORAL DAILY
Qty: 90 TABLET | Refills: 1 | Status: SHIPPED | OUTPATIENT
Start: 2020-02-03 | End: 2020-10-29

## 2020-02-03 RX ORDER — AMLODIPINE BESYLATE 10 MG/1
10 TABLET ORAL DAILY
Qty: 30 TABLET | Refills: 2 | Status: SHIPPED | OUTPATIENT
Start: 2020-02-03 | End: 2020-02-03

## 2020-02-03 RX ORDER — FENOFIBRATE 145 MG/1
145 TABLET, COATED ORAL DAILY
Qty: 30 TABLET | Refills: 4 | Status: SHIPPED | OUTPATIENT
Start: 2020-02-03 | End: 2021-01-28

## 2020-02-03 NOTE — PROGRESS NOTES
Subjective        Chief Complaint   Patient presents with   • Hypertension     6 week fup htn med eval refills    • Hyperlipidemia           Kings Mera is a 54 y.o. male who presents for    Patient Active Problem List   Diagnosis   • Hypertension   • Obstructive sleep apnea   • Hypercholesterolemia   • Hyperglycemia   • Numbness and tingling in left hand   • Class 2 obesity due to excess calories without serious comorbidity with body mass index (BMI) of 36.0 to 36.9 in adult       History of Present Illness     His SBP has been 160-180 in the last 10 days. He denies chest pain. He has had a headache this am without slurred speech or blindness. He is drinking about 4 beers per day. He is exercising 5 days per week with walking 3 miles at a time. He had a sleep study and he gets a new machine soon. He did PT for his left arm and it is 80 percent better. He stopped his Losartan b/c I started Norvasc.  No Known Allergies    Current Outpatient Medications on File Prior to Visit   Medication Sig Dispense Refill   • Omega-3 Fatty Acids (FISH OIL) 1000 MG capsule capsule Take  by mouth Daily With Breakfast.     • omeprazole (priLOSEC) 20 MG capsule TAKE ONE CAPSULE BY MOUTH DAILY 30 capsule 9   • simvastatin (ZOCOR) 40 MG tablet Take 40 mg by mouth Every Night.     • [DISCONTINUED] amLODIPine (NORVASC) 5 MG tablet Take 1 tablet by mouth Daily. 30 tablet 2   • [DISCONTINUED] losartan (COZAAR) 100 MG tablet Take 1 tablet by mouth Daily. 90 tablet 1   • [DISCONTINUED] fenofibrate (TRICOR) 145 MG tablet Take 1 tablet by mouth Daily. 30 tablet 0   • [DISCONTINUED] simvastatin (ZOCOR) 40 MG tablet TAKE ONE TABLET BY MOUTH DAILY 90 tablet 0     No current facility-administered medications on file prior to visit.        Past Medical History:   Diagnosis Date   • Alopecia areata    • Back esophagus    • Diverticulitis    • Fatty liver    • Gastroesophageal reflux    • Hypercholesterolemia    • Hyperglycemia    • Hypertension   "  • Obesity    • Obstructive sleep apnea    • Tinea versicolor    • Tinnitus        Past Surgical History:   Procedure Laterality Date   • COLONOSCOPY  2015   • UPPER GASTROINTESTINAL ENDOSCOPY  2019       Family History   Problem Relation Age of Onset   • No Known Problems Mother    • Other Father         head injury       Social History     Socioeconomic History   • Marital status:      Spouse name: Not on file   • Number of children: Not on file   • Years of education: Not on file   • Highest education level: Not on file   Tobacco Use   • Smoking status: Former Smoker     Packs/day: 1.00     Years: 4.00     Pack years: 4.00     Types: Cigarettes     Last attempt to quit:      Years since quittin.1   • Smokeless tobacco: Never Used   Substance and Sexual Activity   • Alcohol use: Yes     Frequency: 2-3 times a week     Drinks per session: 5 or 6     Comment: Social - 12-18 beer per week   • Drug use: No   • Sexual activity: Defer           The following portions of the patient's history were reviewed and updated as appropriate: problem list, allergies, current medications, past medical history, past family history, past social history and past surgical history.    Review of Systems   Respiratory: Negative for shortness of breath.    Cardiovascular: Negative for chest pain.       Immunization History   Administered Date(s) Administered   • Hepatitis A 11/10/2017, 2019   • Hepatitis B 11/10/2017, 2017, 2019   • Tdap 2010, 2020   • Zostavax 2014       Objective   Vitals:    20 1406   BP: 148/84   Weight: 110 kg (241 lb 9.6 oz)   Height: 172.7 cm (68\")     Body mass index is 36.74 kg/m².  Physical Exam   Constitutional: He appears well-developed and well-nourished.   HENT:   Head: Normocephalic and atraumatic.   Eyes: Pupils are equal, round, and reactive to light. Conjunctivae are normal.   Cardiovascular: Normal rate, regular rhythm, S1 normal, S2 " normal and normal heart sounds.   Pulmonary/Chest: Effort normal and breath sounds normal.   Neurological: He is alert.   Skin: Skin is warm.   Psychiatric: He has a normal mood and affect.   Vitals reviewed.      Procedures    Assessment/Plan   Kings was seen today for hypertension and hyperlipidemia.    Diagnoses and all orders for this visit:    Essential hypertension  -     Discontinue: amLODIPine (NORVASC) 10 MG tablet; Take 1 tablet by mouth Daily.  -     Discontinue: amLODIPine (NORVASC) 5 MG tablet; Take 1 tablet by mouth Daily.  -     losartan (COZAAR) 100 MG tablet; Take 1 tablet by mouth Daily.    Hypercholesterolemia  -     Discontinue: atorvastatin (LIPITOR) 40 MG tablet; Take 1 tablet by mouth Daily.  -     Cancel: Comprehensive Metabolic Panel; Future  -     Cancel: Lipid Panel With / Chol / HDL Ratio; Future  -     Discontinue: simvastatin (ZOCOR) 40 MG tablet; Take 1 tablet by mouth Daily.  -     fenofibrate (TRICOR) 145 MG tablet; Take 1 tablet by mouth Daily.    Other orders  -     Tdap Vaccine Greater Than or Equal To 6yo IM               He ran out of Norvasc 5 mg so refilled it. R/s Losartan since he stopped it accidentally. Stay on Zocor. He will take the letter from insurance to the pharmacy about his tricor. tday today. I called pharmacist (Mo) to review correct medications. Over 22 minutes spent with over 50 percent on counseling and coordinating care including reconciling his medications.  Return in about 6 weeks (around 3/16/2020).

## 2020-03-05 ENCOUNTER — TELEPHONE (OUTPATIENT)
Dept: INTERNAL MEDICINE | Facility: CLINIC | Age: 55
End: 2020-03-05

## 2020-03-12 ENCOUNTER — OFFICE VISIT (OUTPATIENT)
Dept: SLEEP MEDICINE | Facility: HOSPITAL | Age: 55
End: 2020-03-12

## 2020-03-12 VITALS
HEART RATE: 79 BPM | DIASTOLIC BLOOD PRESSURE: 91 MMHG | BODY MASS INDEX: 36.4 KG/M2 | SYSTOLIC BLOOD PRESSURE: 154 MMHG | WEIGHT: 240.2 LBS | HEIGHT: 68 IN | OXYGEN SATURATION: 97 %

## 2020-03-12 DIAGNOSIS — G47.33 OSA ON CPAP: Primary | ICD-10-CM

## 2020-03-12 DIAGNOSIS — Z99.89 OSA ON CPAP: Primary | ICD-10-CM

## 2020-03-12 DIAGNOSIS — E66.09 CLASS 2 OBESITY DUE TO EXCESS CALORIES WITHOUT SERIOUS COMORBIDITY WITH BODY MASS INDEX (BMI) OF 36.0 TO 36.9 IN ADULT: ICD-10-CM

## 2020-03-12 PROCEDURE — 99213 OFFICE O/P EST LOW 20 MIN: CPT | Performed by: INTERNAL MEDICINE

## 2020-03-12 PROCEDURE — G0463 HOSPITAL OUTPT CLINIC VISIT: HCPCS

## 2020-03-12 NOTE — PROGRESS NOTES
[facility]  [address]  [address]rd [place] [address]  Phone   Fax       SLEEP CLINIC FOLLOW UP PROGRESS NOTE.    Kings Mera  1965  54 y.o.  male      PCP: Moncho Gonzalez MD      Date of visit: 3/12/2020    Chief Complaint   Patient presents with   • Sleep Apnea   • Follow-up       INTERM HISTORY:  This is a 54 y.o. years old patient who has a history of sleep apnea and is on CPAP.  Patient reports good compliance with the device.  Patient feels much better when he wakes up and he has more energy and is sleeping longer.    Sleep schedule  Normally goes to bed at 9 PM  Wakes up at 5 AM  Feels refreshed after waking up: Yes      The Smart card downloaded on 3/12/2020 has been reviewed by me and shows the following..  Compliance; 100 %  > 4 hr use, 97 %  Average use of the device 8 hours and 5 minutes per night  Residual AHI: 0.5 /hr (normal less than 5)  Mask type: Nasal pillows  DME: [facility]      Past Medical History:   Diagnosis Date   • Alopecia areata    • Back esophagus    • Diverticulitis    • Fatty liver    • Gastroesophageal reflux    • Hypercholesterolemia    • Hyperglycemia    • Hypertension    • Obesity    • Obstructive sleep apnea    • DIMAS on CPAP    • Tinea versicolor    • Tinnitus        MEDICATIONS: reviewed by me    Current Outpatient Medications:   •  amLODIPine (NORVASC) 5 MG tablet, Take 1 tablet by mouth Daily., Disp: 30 tablet, Rfl: 2  •  fenofibrate (TRICOR) 145 MG tablet, Take 1 tablet by mouth Daily., Disp: 30 tablet, Rfl: 4  •  losartan (COZAAR) 100 MG tablet, Take 1 tablet by mouth Daily., Disp: 90 tablet, Rfl: 1  •  Omega-3 Fatty Acids (FISH OIL) 1000 MG capsule capsule, Take  by mouth Daily With Breakfast., Disp: , Rfl:   •  omeprazole (priLOSEC) 20 MG capsule, TAKE ONE CAPSULE BY MOUTH DAILY, Disp: 30 capsule, Rfl: 9  •  simvastatin (ZOCOR) 40 MG tablet, Take 40 mg by mouth Every Night., Disp: , Rfl:     No Known  "Allergies reviewed by me    SOCIAL, FAMILY HISTORY: Medical records are reviewed and noted by me.  History of smoking no  History of alcohol use 8 to 10/week  Caffeine use 1 cup of coffee    REVIEW OF SYSTEMS:   [place] Sleepiness Scale :Total score: 3   Snoring: Resolved  Morning headache: No  Nasal congestion: No  Leg movements: No  Number of times you wake up once asleep: No  Heart burn no    PHYSICAL EXAMINATION:  Vitals:    03/12/20 1041   BP: 154/91   Pulse: 79   SpO2: 97%   Weight: 109 kg (240 lb 3.2 oz)   Height: 172.7 cm (68\")    Body mass index is 36.52 kg/m².    HEENT: pupils are round equal and reacting to light and accommodation, nasal passage is clear, no nasal polyps, no lymphadenopathy, throat is clear, oral airway Mallampati class 3  RESPRATORY SYSTEM: Breath sounds are equal on both sides and are normal, no wheezes or crackles  CARDIOVASULAR SYSTEM: Heart rate is regular without murmur  ABDOMEN: Soft, no ascites, no hepatosplenomegaly.  EXTREMITIES: No cyanosis, clubbing or edema       ASSESSMENT AND PLAN:  · Obstructive sleep apnea, patient is using the CPAP with good compliance for treatment of sleep apnea.  I have reviewed the smart card down load and discussed with the patient the download data and encouarged the patient to continue to use the CPAP.  The symptoms have improved and the residual AHI is acceptable.  The device is benefiting the patient and the device is medically necessary. The patient is also instructed to get the CPAP supplies from the [employer] and see me in 1 year for follow-up.  The prescription for supplies has been sent to the DME company.    · Obesity, patient's BMI is Body mass index is 36.52 kg/m²..  I have discussed weight reduction and the health benefits.  I have also discuss the relationship between the weight and sleep apnea and encouraged the patient to lose weight which is beneficial in treating sleep apnea. Discussed diet and exercise with the " patient.        Lg Estrada MD, [facility]  Sleep Medicine.(Board-certified)  [facility]  Medical Director for [facility]   3/12/2020

## 2020-03-20 ENCOUNTER — OFFICE VISIT (OUTPATIENT)
Dept: INTERNAL MEDICINE | Facility: CLINIC | Age: 55
End: 2020-03-20

## 2020-03-20 VITALS
HEIGHT: 68 IN | BODY MASS INDEX: 36.83 KG/M2 | SYSTOLIC BLOOD PRESSURE: 142 MMHG | WEIGHT: 243 LBS | DIASTOLIC BLOOD PRESSURE: 84 MMHG

## 2020-03-20 DIAGNOSIS — I10 ESSENTIAL HYPERTENSION: Primary | ICD-10-CM

## 2020-03-20 DIAGNOSIS — E78.00 HYPERCHOLESTEROLEMIA: ICD-10-CM

## 2020-03-20 PROCEDURE — 99213 OFFICE O/P EST LOW 20 MIN: CPT | Performed by: INTERNAL MEDICINE

## 2020-03-20 RX ORDER — AMLODIPINE BESYLATE 10 MG/1
10 TABLET ORAL DAILY
Qty: 30 TABLET | Refills: 3 | Status: SHIPPED | OUTPATIENT
Start: 2020-03-20 | End: 2020-05-26

## 2020-03-20 RX ORDER — ATORVASTATIN CALCIUM 40 MG/1
40 TABLET, FILM COATED ORAL DAILY
Qty: 30 TABLET | Refills: 3 | Status: SHIPPED | OUTPATIENT
Start: 2020-03-20 | End: 2020-05-26

## 2020-03-20 NOTE — PROGRESS NOTES
Subjective        Chief Complaint   Patient presents with   • Hypertension     follow up           Kings Mera is a 54 y.o. male who presents for    Patient Active Problem List   Diagnosis   • Hypertension   • DIMAS on CPAP   • Hypercholesterolemia   • Hyperglycemia   • Numbness and tingling in left hand   • Class 2 obesity due to excess calories without serious comorbidity with body mass index (BMI) of 36.0 to 36.9 in adult       History of Present Illness     He has not checked his BP in the last 3-4 days. His BP was 145/90. He will get some chest pain that moves around. He says it feels like a muscle strain. It does not stay in one spot. It does not happen with activity; it only occurs with sitting. He got a new CPAP machine which he likes much better. He is trying to walk 3-5 miles per day 5 days per week.   No Known Allergies    Current Outpatient Medications on File Prior to Visit   Medication Sig Dispense Refill   • fenofibrate (TRICOR) 145 MG tablet Take 1 tablet by mouth Daily. 30 tablet 4   • losartan (COZAAR) 100 MG tablet Take 1 tablet by mouth Daily. 90 tablet 1   • Omega-3 Fatty Acids (FISH OIL) 1000 MG capsule capsule Take  by mouth Daily With Breakfast.     • omeprazole (priLOSEC) 20 MG capsule TAKE ONE CAPSULE BY MOUTH DAILY 30 capsule 9   • [DISCONTINUED] amLODIPine (NORVASC) 5 MG tablet Take 1 tablet by mouth Daily. 30 tablet 2   • [DISCONTINUED] simvastatin (ZOCOR) 40 MG tablet Take 40 mg by mouth Every Night.       No current facility-administered medications on file prior to visit.        Past Medical History:   Diagnosis Date   • Alopecia areata    • Back esophagus    • Diverticulitis    • Fatty liver    • Gastroesophageal reflux    • Hypercholesterolemia    • Hyperglycemia    • Hypertension    • Obesity    • Obstructive sleep apnea    • DIMAS on CPAP    • Tinea versicolor    • Tinnitus        Past Surgical History:   Procedure Laterality Date   • COLONOSCOPY  08/28/2015   • UPPER  "GASTROINTESTINAL ENDOSCOPY  2019       Family History   Problem Relation Age of Onset   • No Known Problems Mother    • Other Father         head injury       Social History     Socioeconomic History   • Marital status:      Spouse name: Not on file   • Number of children: Not on file   • Years of education: Not on file   • Highest education level: Not on file   Tobacco Use   • Smoking status: Former Smoker     Packs/day: 1.00     Years: 4.00     Pack years: 4.00     Types: Cigarettes     Last attempt to quit: 1990     Years since quittin.2   • Smokeless tobacco: Never Used   Substance and Sexual Activity   • Alcohol use: Yes     Frequency: 2-3 times a week     Drinks per session: 5 or 6     Comment: Social - 12-18 beer per week   • Drug use: No   • Sexual activity: Defer           The following portions of the patient's history were reviewed and updated as appropriate: problem list, allergies, current medications, past medical history, past family history, past social history and past surgical history.    Review of Systems   Cardiovascular: Positive for chest pain.       Immunization History   Administered Date(s) Administered   • Hepatitis A 11/10/2017, 2019   • Hepatitis B 11/10/2017, 2017, 2019   • Tdap 2010, 2020   • Zostavax 2014       Objective   Vitals:    20 1257   BP: 142/84   Weight: 110 kg (243 lb)   Height: 172.7 cm (68\")     Body mass index is 36.95 kg/m².  Physical Exam   Constitutional: He appears well-developed and well-nourished.   HENT:   Head: Normocephalic and atraumatic.   Cardiovascular: Normal rate, regular rhythm, S1 normal, S2 normal and normal heart sounds.   Pulmonary/Chest: Effort normal and breath sounds normal.   Neurological: He is alert.   Skin: Skin is warm.   Psychiatric: He has a normal mood and affect.   Vitals reviewed.      Procedures    Assessment/Plan   Kings was seen today for hypertension.    Diagnoses and all orders for " this visit:    Essential hypertension  -     amLODIPine (NORVASC) 10 MG tablet; Take 1 tablet by mouth Daily.    Hypercholesterolemia  -     atorvastatin (LIPITOR) 40 MG tablet; Take 1 tablet by mouth Daily.  -     Comprehensive Metabolic Panel; Future  -     Lipid Panel With / Chol / HDL Ratio; Future             Increase Norvasc to 10 mg daily. Change Zocor to Lipitor given risk of Norvasc 10 mg with Zocor. I do not think the random chest pain is cardiac as he does not experience with activity.  Return in about 7 weeks (around 5/8/2020).

## 2020-05-08 DIAGNOSIS — E78.00 HYPERCHOLESTEROLEMIA: ICD-10-CM

## 2020-05-09 LAB
ALBUMIN SERPL-MCNC: 4.8 G/DL (ref 3.5–5.2)
ALBUMIN/GLOB SERPL: 1.8 G/DL
ALP SERPL-CCNC: 58 U/L (ref 39–117)
ALT SERPL-CCNC: 37 U/L (ref 1–41)
AST SERPL-CCNC: 35 U/L (ref 1–40)
BILIRUB SERPL-MCNC: 0.7 MG/DL (ref 0.2–1.2)
BUN SERPL-MCNC: 18 MG/DL (ref 6–20)
BUN/CREAT SERPL: 19.4 (ref 7–25)
CALCIUM SERPL-MCNC: 9.7 MG/DL (ref 8.6–10.5)
CHLORIDE SERPL-SCNC: 97 MMOL/L (ref 98–107)
CHOLEST SERPL-MCNC: 177 MG/DL (ref 0–200)
CHOLEST/HDLC SERPL: 4.32 {RATIO}
CO2 SERPL-SCNC: 28.7 MMOL/L (ref 22–29)
CREAT SERPL-MCNC: 0.93 MG/DL (ref 0.76–1.27)
GLOBULIN SER CALC-MCNC: 2.6 GM/DL
GLUCOSE SERPL-MCNC: 115 MG/DL (ref 65–99)
HDLC SERPL-MCNC: 41 MG/DL (ref 40–60)
LDLC SERPL CALC-MCNC: 108 MG/DL (ref 0–100)
POTASSIUM SERPL-SCNC: 4 MMOL/L (ref 3.5–5.2)
PROT SERPL-MCNC: 7.4 G/DL (ref 6–8.5)
SODIUM SERPL-SCNC: 140 MMOL/L (ref 136–145)
TRIGL SERPL-MCNC: 138 MG/DL (ref 0–150)
VLDLC SERPL CALC-MCNC: 27.6 MG/DL (ref 5–40)

## 2020-05-15 ENCOUNTER — OFFICE VISIT (OUTPATIENT)
Dept: INTERNAL MEDICINE | Facility: CLINIC | Age: 55
End: 2020-05-15

## 2020-05-15 VITALS
HEIGHT: 68 IN | BODY MASS INDEX: 37.13 KG/M2 | SYSTOLIC BLOOD PRESSURE: 142 MMHG | TEMPERATURE: 97.2 F | DIASTOLIC BLOOD PRESSURE: 80 MMHG | WEIGHT: 245 LBS

## 2020-05-15 DIAGNOSIS — R73.9 HYPERGLYCEMIA: ICD-10-CM

## 2020-05-15 DIAGNOSIS — Z11.59 NEED FOR HEPATITIS C SCREENING TEST: ICD-10-CM

## 2020-05-15 DIAGNOSIS — I10 ESSENTIAL HYPERTENSION: Primary | ICD-10-CM

## 2020-05-15 DIAGNOSIS — E78.00 HYPERCHOLESTEROLEMIA: ICD-10-CM

## 2020-05-15 PROCEDURE — 99213 OFFICE O/P EST LOW 20 MIN: CPT | Performed by: INTERNAL MEDICINE

## 2020-05-15 RX ORDER — HYDROCHLOROTHIAZIDE 12.5 MG/1
12.5 TABLET ORAL DAILY
Qty: 30 TABLET | Refills: 3 | Status: SHIPPED | OUTPATIENT
Start: 2020-05-15 | End: 2020-07-21

## 2020-05-15 NOTE — PROGRESS NOTES
Subjective        Chief Complaint   Patient presents with   • Hypertension     follow up           Kings Mera is a 54 y.o. male who presents for    Patient Active Problem List   Diagnosis   • Hypertension   • DIMAS on CPAP   • Hypercholesterolemia   • Hyperglycemia   • Numbness and tingling in left hand   • Class 2 obesity due to excess calories without serious comorbidity with body mass index (BMI) of 36.0 to 36.9 in adult       History of Present Illness     He has not checked his BP until yesterday; it was 147/74. He is walking 3 miles four days per week. He denies chest pain with walking and he is not having GERD. His mother  of cancer two weeks ago. He is drinking 12 beers per week.  No Known Allergies    Current Outpatient Medications on File Prior to Visit   Medication Sig Dispense Refill   • amLODIPine (NORVASC) 10 MG tablet Take 1 tablet by mouth Daily. 30 tablet 3   • atorvastatin (LIPITOR) 40 MG tablet Take 1 tablet by mouth Daily. 30 tablet 3   • fenofibrate (TRICOR) 145 MG tablet Take 1 tablet by mouth Daily. 30 tablet 4   • losartan (COZAAR) 100 MG tablet Take 1 tablet by mouth Daily. 90 tablet 1   • Omega-3 Fatty Acids (FISH OIL) 1000 MG capsule capsule Take  by mouth Daily With Breakfast.     • omeprazole (priLOSEC) 20 MG capsule TAKE ONE CAPSULE BY MOUTH DAILY 30 capsule 9     No current facility-administered medications on file prior to visit.        Past Medical History:   Diagnosis Date   • Alopecia areata    • Back esophagus    • Diverticulitis    • Fatty liver    • Gastroesophageal reflux    • Hypercholesterolemia    • Hyperglycemia    • Hypertension    • Obesity    • Obstructive sleep apnea    • DIMAS on CPAP    • Tinea versicolor    • Tinnitus        Past Surgical History:   Procedure Laterality Date   • COLONOSCOPY  2015   • UPPER GASTROINTESTINAL ENDOSCOPY  2019       Family History   Problem Relation Age of Onset   • Cancer Mother    • Other Father         head injury  "      Social History     Socioeconomic History   • Marital status:      Spouse name: Not on file   • Number of children: Not on file   • Years of education: Not on file   • Highest education level: Not on file   Tobacco Use   • Smoking status: Former Smoker     Packs/day: 1.00     Years: 4.00     Pack years: 4.00     Types: Cigarettes     Last attempt to quit:      Years since quittin.3   • Smokeless tobacco: Never Used   Substance and Sexual Activity   • Alcohol use: Yes     Frequency: 2-3 times a week     Drinks per session: 5 or 6     Comment: Social - 12-18 beer per week   • Drug use: No   • Sexual activity: Defer           The following portions of the patient's history were reviewed and updated as appropriate: problem list, allergies, current medications, past medical history, past family history, past social history and past surgical history.    Review of Systems   Cardiovascular: Negative for chest pain.   Gastrointestinal: Negative for GERD.       Immunization History   Administered Date(s) Administered   • Hepatitis A 11/10/2017, 2019   • Hepatitis B 11/10/2017, 2017, 2019   • Tdap 2010, 2020   • Zostavax 2014       Objective   Vitals:    05/15/20 1251   BP: 142/80   Temp: 97.2 °F (36.2 °C)   Weight: 111 kg (245 lb)   Height: 172.7 cm (68\")     Body mass index is 37.25 kg/m².  Physical Exam   Constitutional: He appears well-developed and well-nourished.   HENT:   Head: Normocephalic and atraumatic.   Mouth/Throat: Oropharynx is clear and moist.   Cardiovascular: Normal rate, regular rhythm, S1 normal, S2 normal and normal heart sounds.   Pulmonary/Chest: Effort normal and breath sounds normal.   Neurological: He is alert.   Skin: Skin is warm.   Psychiatric: He has a normal mood and affect.   Vitals reviewed.      Procedures    Assessment/Plan   Kings was seen today for hypertension.    Diagnoses and all orders for this visit:    Essential hypertension  - "     hydroCHLOROthiazide (HYDRODIURIL) 12.5 MG tablet; Take 1 tablet by mouth Daily.  -     Basic Metabolic Panel; Future    Hypercholesterolemia  Comments:  LDL is good    Hyperglycemia  Comments:  Check a1c next time  Orders:  -     Hemoglobin A1c; Future    Need for hepatitis C screening test  -     Hepatitis C Antibody; Future        BP is still high. Add HCTZ. Reviewed labs. Discussed wt loss.         Return in about 6 weeks (around 6/26/2020).

## 2020-05-22 DIAGNOSIS — I10 ESSENTIAL HYPERTENSION: ICD-10-CM

## 2020-05-22 DIAGNOSIS — E78.00 HYPERCHOLESTEROLEMIA: ICD-10-CM

## 2020-05-26 RX ORDER — AMLODIPINE BESYLATE 10 MG/1
TABLET ORAL
Qty: 30 TABLET | Refills: 2 | Status: SHIPPED | OUTPATIENT
Start: 2020-05-26 | End: 2020-08-26

## 2020-05-26 RX ORDER — ATORVASTATIN CALCIUM 40 MG/1
TABLET, FILM COATED ORAL
Qty: 30 TABLET | Refills: 2 | Status: SHIPPED | OUTPATIENT
Start: 2020-05-26 | End: 2020-08-26

## 2020-06-15 ENCOUNTER — RESULTS ENCOUNTER (OUTPATIENT)
Dept: INTERNAL MEDICINE | Facility: CLINIC | Age: 55
End: 2020-06-15

## 2020-06-15 DIAGNOSIS — R73.9 HYPERGLYCEMIA: ICD-10-CM

## 2020-06-15 DIAGNOSIS — I10 ESSENTIAL HYPERTENSION: ICD-10-CM

## 2020-06-15 DIAGNOSIS — Z11.59 NEED FOR HEPATITIS C SCREENING TEST: ICD-10-CM

## 2020-06-26 ENCOUNTER — OFFICE VISIT (OUTPATIENT)
Dept: INTERNAL MEDICINE | Facility: CLINIC | Age: 55
End: 2020-06-26

## 2020-06-26 VITALS
WEIGHT: 238 LBS | SYSTOLIC BLOOD PRESSURE: 122 MMHG | HEIGHT: 68 IN | TEMPERATURE: 97.7 F | DIASTOLIC BLOOD PRESSURE: 80 MMHG | BODY MASS INDEX: 36.07 KG/M2

## 2020-06-26 DIAGNOSIS — Z12.5 PROSTATE CANCER SCREENING: ICD-10-CM

## 2020-06-26 DIAGNOSIS — R73.9 HYPERGLYCEMIA: ICD-10-CM

## 2020-06-26 DIAGNOSIS — I10 ESSENTIAL HYPERTENSION: Primary | ICD-10-CM

## 2020-06-26 PROCEDURE — 99213 OFFICE O/P EST LOW 20 MIN: CPT | Performed by: INTERNAL MEDICINE

## 2020-06-26 NOTE — PROGRESS NOTES
Subjective        Chief Complaint   Patient presents with   • Hypertension           Kings Mera is a 54 y.o. male who presents for    Patient Active Problem List   Diagnosis   • Hypertension   • DIMAS on CPAP   • Hypercholesterolemia   • Hyperglycemia   • Numbness and tingling in left hand   • Class 2 obesity due to excess calories without serious comorbidity with body mass index (BMI) of 36.0 to 36.9 in adult       History of Present Illness     He has been checking his BP and it has been averaging 120-130/70s. He denies chest pain. His breathing is better with walking 12K steps per day. He has decreased his alcohol consumption during the week.   No Known Allergies    Current Outpatient Medications on File Prior to Visit   Medication Sig Dispense Refill   • amLODIPine (NORVASC) 10 MG tablet TAKE ONE TABLET BY MOUTH DAILY 30 tablet 2   • atorvastatin (LIPITOR) 40 MG tablet TAKE ONE TABLET BY MOUTH DAILY 30 tablet 2   • fenofibrate (TRICOR) 145 MG tablet Take 1 tablet by mouth Daily. 30 tablet 4   • hydroCHLOROthiazide (HYDRODIURIL) 12.5 MG tablet Take 1 tablet by mouth Daily. 30 tablet 3   • losartan (COZAAR) 100 MG tablet Take 1 tablet by mouth Daily. 90 tablet 1   • Omega-3 Fatty Acids (FISH OIL) 1000 MG capsule capsule Take  by mouth Daily With Breakfast.     • omeprazole (priLOSEC) 20 MG capsule TAKE ONE CAPSULE BY MOUTH DAILY 30 capsule 9     No current facility-administered medications on file prior to visit.        Past Medical History:   Diagnosis Date   • Alopecia areata    • Back esophagus    • Diverticulitis    • Fatty liver    • Gastroesophageal reflux    • Hypercholesterolemia    • Hyperglycemia    • Hypertension    • Obesity    • Obstructive sleep apnea    • DIMAS on CPAP    • Tinea versicolor    • Tinnitus        Past Surgical History:   Procedure Laterality Date   • COLONOSCOPY  08/28/2015   • UPPER GASTROINTESTINAL ENDOSCOPY  2019       Family History   Problem Relation Age of Onset   • Cancer  "Mother    • Other Father         head injury       Social History     Socioeconomic History   • Marital status:      Spouse name: Not on file   • Number of children: Not on file   • Years of education: Not on file   • Highest education level: Not on file   Tobacco Use   • Smoking status: Former Smoker     Packs/day: 1.00     Years: 4.00     Pack years: 4.00     Types: Cigarettes     Last attempt to quit: 1990     Years since quittin.5   • Smokeless tobacco: Never Used   Substance and Sexual Activity   • Alcohol use: Yes     Frequency: 2-3 times a week     Drinks per session: 5 or 6     Comment: Social - 12-18 beer per week   • Drug use: No   • Sexual activity: Defer           The following portions of the patient's history were reviewed and updated as appropriate: problem list, allergies, current medications, past medical history, past family history, past social history and past surgical history.    Review of Systems   Cardiovascular: Negative for chest pain.       Immunization History   Administered Date(s) Administered   • Hepatitis A 11/10/2017, 2019   • Hepatitis B 11/10/2017, 2017, 2019   • Tdap 2010, 2020   • Zostavax 2014       Objective   Vitals:    20 0802   BP: 122/80   Temp: 97.7 °F (36.5 °C)   Weight: 108 kg (238 lb)   Height: 172.7 cm (68\")     Body mass index is 36.19 kg/m².  Physical Exam   Constitutional: He appears well-developed and well-nourished.   HENT:   Head: Normocephalic and atraumatic.   Cardiovascular: Normal rate, regular rhythm, S1 normal, S2 normal and normal heart sounds.   Pulmonary/Chest: Effort normal and breath sounds normal.   Neurological: He is alert.   Skin: Skin is warm.   Psychiatric: He has a normal mood and affect.   Vitals reviewed.      Procedures    Assessment/Plan   Kings was seen today for hypertension.    Diagnoses and all orders for this visit:    Essential hypertension  -     Comprehensive Metabolic Panel; " Future  -     Lipid Panel With / Chol / HDL Ratio; Future    Hyperglycemia  Comments:  a1c today    Prostate cancer screening  -     PSA Screen; Future               BP is better. Labs today.  Return in about 6 months (around 12/26/2020) for Annual physical.

## 2020-06-27 LAB
BUN SERPL-MCNC: 20 MG/DL (ref 6–20)
BUN/CREAT SERPL: 16.1 (ref 7–25)
CALCIUM SERPL-MCNC: 11.1 MG/DL (ref 8.6–10.5)
CHLORIDE SERPL-SCNC: 100 MMOL/L (ref 98–107)
CO2 SERPL-SCNC: 25.8 MMOL/L (ref 22–29)
CREAT SERPL-MCNC: 1.24 MG/DL (ref 0.76–1.27)
GLUCOSE SERPL-MCNC: 131 MG/DL (ref 65–99)
HBA1C MFR BLD: 5.9 % (ref 4.8–5.6)
HCV AB S/CO SERPL IA: 0.2 S/CO RATIO (ref 0–0.9)
POTASSIUM SERPL-SCNC: 4.8 MMOL/L (ref 3.5–5.2)
SODIUM SERPL-SCNC: 139 MMOL/L (ref 136–145)

## 2020-06-28 DIAGNOSIS — E83.52 HYPERCALCEMIA: Primary | ICD-10-CM

## 2020-07-21 ENCOUNTER — OFFICE VISIT (OUTPATIENT)
Dept: INTERNAL MEDICINE | Facility: CLINIC | Age: 55
End: 2020-07-21

## 2020-07-21 VITALS
TEMPERATURE: 97.9 F | DIASTOLIC BLOOD PRESSURE: 68 MMHG | HEIGHT: 68 IN | HEART RATE: 66 BPM | WEIGHT: 225 LBS | SYSTOLIC BLOOD PRESSURE: 104 MMHG | BODY MASS INDEX: 34.1 KG/M2

## 2020-07-21 DIAGNOSIS — E83.52 HYPERCALCEMIA: ICD-10-CM

## 2020-07-21 DIAGNOSIS — I10 ESSENTIAL HYPERTENSION: Primary | ICD-10-CM

## 2020-07-21 DIAGNOSIS — R73.03 PRE-DIABETES: ICD-10-CM

## 2020-07-21 DIAGNOSIS — Z12.5 PROSTATE CANCER SCREENING: ICD-10-CM

## 2020-07-21 DIAGNOSIS — E66.09 CLASS 2 OBESITY DUE TO EXCESS CALORIES WITHOUT SERIOUS COMORBIDITY WITH BODY MASS INDEX (BMI) OF 36.0 TO 36.9 IN ADULT: ICD-10-CM

## 2020-07-21 PROCEDURE — 99214 OFFICE O/P EST MOD 30 MIN: CPT | Performed by: INTERNAL MEDICINE

## 2020-07-21 NOTE — PROGRESS NOTES
Subjective        Chief Complaint   Patient presents with   • Hypertension           Kings Mera is a 54 y.o. male who presents for    Patient Active Problem List   Diagnosis   • Hypertension   • DIMAS on CPAP   • Hypercholesterolemia   • Numbness and tingling in left hand   • Class 2 obesity due to excess calories without serious comorbidity with body mass index (BMI) of 36.0 to 36.9 in adult   • Pre-diabetes   • Hypercalcemia       History of Present Illness     He denies nausea, vomiting, abdominal pain, chest pain or dyspnea. He checks his BP daily. His Bp has been running 100/60. He is dizzy 3-4 times per day. He is working on losing wt. He has lost 13 pounds over the last month with walking and changing his diet. He is not overeating. He is getting over 15,000 steps per day. He does not take any calcium supplements or Tums. He has cut out beer.  No Known Allergies    Current Outpatient Medications on File Prior to Visit   Medication Sig Dispense Refill   • amLODIPine (NORVASC) 10 MG tablet TAKE ONE TABLET BY MOUTH DAILY 30 tablet 2   • atorvastatin (LIPITOR) 40 MG tablet TAKE ONE TABLET BY MOUTH DAILY 30 tablet 2   • fenofibrate (TRICOR) 145 MG tablet Take 1 tablet by mouth Daily. 30 tablet 4   • losartan (COZAAR) 100 MG tablet Take 1 tablet by mouth Daily. 90 tablet 1   • Omega-3 Fatty Acids (FISH OIL) 1000 MG capsule capsule Take  by mouth Daily With Breakfast.     • omeprazole (priLOSEC) 20 MG capsule TAKE ONE CAPSULE BY MOUTH DAILY 30 capsule 9   • [DISCONTINUED] hydroCHLOROthiazide (HYDRODIURIL) 12.5 MG tablet Take 1 tablet by mouth Daily. 30 tablet 3     No current facility-administered medications on file prior to visit.        Past Medical History:   Diagnosis Date   • Alopecia areata    • Back esophagus    • Diverticulitis    • Fatty liver    • Gastroesophageal reflux    • Hypercholesterolemia    • Hyperglycemia    • Hypertension    • Obesity    • Obstructive sleep apnea    • DIMAS on CPAP    • Tinea  "versicolor    • Tinnitus        Past Surgical History:   Procedure Laterality Date   • COLONOSCOPY  2015   • UPPER GASTROINTESTINAL ENDOSCOPY  2019       Family History   Problem Relation Age of Onset   • Cancer Mother    • Other Father         head injury       Social History     Socioeconomic History   • Marital status:      Spouse name: Not on file   • Number of children: Not on file   • Years of education: Not on file   • Highest education level: Not on file   Tobacco Use   • Smoking status: Former Smoker     Packs/day: 1.00     Years: 4.00     Pack years: 4.00     Types: Cigarettes     Last attempt to quit:      Years since quittin.5   • Smokeless tobacco: Never Used   Substance and Sexual Activity   • Alcohol use: Yes     Frequency: 2-3 times a week     Drinks per session: 5 or 6     Comment: Social - 12-18 beer per week   • Drug use: No   • Sexual activity: Defer           The following portions of the patient's history were reviewed and updated as appropriate: problem list, allergies, current medications, past medical history, past family history, past social history and past surgical history.    Review of Systems   Respiratory: Negative for shortness of breath.    Cardiovascular: Negative for chest pain.       Immunization History   Administered Date(s) Administered   • Hepatitis A 11/10/2017, 2019   • Hepatitis B 11/10/2017, 2017, 2019   • Tdap 2010, 2020   • Zostavax 2014       Objective   Vitals:    20 0746   BP: 104/68   Pulse: 66   Temp: 97.9 °F (36.6 °C)   Weight: 102 kg (225 lb)   Height: 172.7 cm (68\")     Body mass index is 34.21 kg/m².  Physical Exam   Constitutional: He appears well-developed and well-nourished.   HENT:   Head: Normocephalic and atraumatic.   Cardiovascular: Normal rate, regular rhythm, S1 normal, S2 normal and normal heart sounds.   Pulmonary/Chest: Effort normal and breath sounds normal.   Neurological: He is " alert.   Skin: Skin is warm.   Psychiatric: He has a normal mood and affect.   Vitals reviewed.      Procedures    Assessment/Plan   Kings was seen today for hypertension.    Diagnoses and all orders for this visit:    Essential hypertension  Comments:  Stop HCTZ with wt loss and with calcium being increased.  Orders:  -     Comprehensive Metabolic Panel; Future  -     Lipid Panel With / Chol / HDL Ratio; Future    Class 2 obesity due to excess calories without serious comorbidity with body mass index (BMI) of 36.0 to 36.9 in adult  Comments:  Losing wt on purpose    Pre-diabetes  Comments:  Reviewed labs. He is losing wt on purpose  Orders:  -     Hemoglobin A1c; Future    Hypercalcemia  Comments:  Repeat calcium with PTH today.    Prostate cancer screening  -     PSA Screen; Future               BP is excellent. Stop HCTZ. Repeat calcium today. Discussed implications of pre diabetes.  Return in about 4 weeks (around 8/18/2020).

## 2020-07-22 LAB
CA-I SERPL ISE-MCNC: 5.4 MG/DL (ref 4.5–5.6)
PTH-INTACT SERPL-MCNC: 10 PG/ML (ref 15–65)

## 2020-08-21 ENCOUNTER — OFFICE VISIT (OUTPATIENT)
Dept: INTERNAL MEDICINE | Facility: CLINIC | Age: 55
End: 2020-08-21

## 2020-08-21 VITALS
DIASTOLIC BLOOD PRESSURE: 82 MMHG | HEIGHT: 68 IN | BODY MASS INDEX: 33.95 KG/M2 | TEMPERATURE: 97.8 F | WEIGHT: 224 LBS | SYSTOLIC BLOOD PRESSURE: 118 MMHG

## 2020-08-21 DIAGNOSIS — I10 ESSENTIAL HYPERTENSION: Primary | ICD-10-CM

## 2020-08-21 DIAGNOSIS — E66.09 CLASS 2 OBESITY DUE TO EXCESS CALORIES WITHOUT SERIOUS COMORBIDITY WITH BODY MASS INDEX (BMI) OF 36.0 TO 36.9 IN ADULT: ICD-10-CM

## 2020-08-21 PROCEDURE — 99213 OFFICE O/P EST LOW 20 MIN: CPT | Performed by: INTERNAL MEDICINE

## 2020-08-21 NOTE — PROGRESS NOTES
Subjective        Chief Complaint   Patient presents with   • Hypertension     4 wk f/u           Kings Mear is a 54 y.o. male who presents for    Patient Active Problem List   Diagnosis   • Essential hypertension   • DIMAS on CPAP   • Hypercholesterolemia   • Numbness and tingling in left hand   • Class 2 obesity due to excess calories without serious comorbidity with body mass index (BMI) of 36.0 to 36.9 in adult   • Pre-diabetes   • Hypercalcemia       History of Present Illness     His BP runs 130-140/70-80. He has rare dizziness if he stands too quickly; it is much better. He denies chest pain or dyspnea. He is trying to get 15,000 steps per day.  No Known Allergies    Current Outpatient Medications on File Prior to Visit   Medication Sig Dispense Refill   • atorvastatin (LIPITOR) 40 MG tablet TAKE ONE TABLET BY MOUTH DAILY 30 tablet 2   • fenofibrate (TRICOR) 145 MG tablet Take 1 tablet by mouth Daily. 30 tablet 4   • losartan (COZAAR) 100 MG tablet Take 1 tablet by mouth Daily. 90 tablet 1   • Omega-3 Fatty Acids (FISH OIL) 1000 MG capsule capsule Take  by mouth Daily With Breakfast.     • omeprazole (priLOSEC) 20 MG capsule TAKE ONE CAPSULE BY MOUTH DAILY 30 capsule 9   • amLODIPine (NORVASC) 10 MG tablet TAKE ONE TABLET BY MOUTH DAILY 30 tablet 2     No current facility-administered medications on file prior to visit.        Past Medical History:   Diagnosis Date   • Alopecia areata    • Back esophagus    • Diverticulitis    • Fatty liver    • Gastroesophageal reflux    • Hypercholesterolemia    • Hypertension    • Obesity    • DIMAS on CPAP    • Tinea versicolor    • Tinnitus        Past Surgical History:   Procedure Laterality Date   • COLONOSCOPY  08/28/2015   • UPPER GASTROINTESTINAL ENDOSCOPY  2019       Family History   Problem Relation Age of Onset   • Cancer Mother    • Other Father         head injury       Social History     Socioeconomic History   • Marital status:      Spouse name: Not  "on file   • Number of children: Not on file   • Years of education: Not on file   • Highest education level: Not on file   Tobacco Use   • Smoking status: Former Smoker     Packs/day: 1.00     Years: 4.00     Pack years: 4.00     Types: Cigarettes     Last attempt to quit: 1990     Years since quittin.6   • Smokeless tobacco: Never Used   Substance and Sexual Activity   • Alcohol use: Yes     Frequency: 2-3 times a week     Drinks per session: 5 or 6     Comment: Social - 12-18 beer per week   • Drug use: No   • Sexual activity: Defer           The following portions of the patient's history were reviewed and updated as appropriate: problem list, allergies, current medications, past medical history, past family history, past social history and past surgical history.    Review of Systems   Respiratory: Negative for shortness of breath.    Cardiovascular: Negative for chest pain.       Immunization History   Administered Date(s) Administered   • Hepatitis A 11/10/2017, 2019   • Hepatitis B 11/10/2017, 2017, 2019   • Tdap 2010, 2020   • Zostavax 2014       Objective   Vitals:    20 1259   BP: 118/82   Temp: 97.8 °F (36.6 °C)   Weight: 102 kg (224 lb)   Height: 172.7 cm (68\")     Body mass index is 34.06 kg/m².  Physical Exam   Constitutional: He appears well-developed and well-nourished.   HENT:   Head: Normocephalic and atraumatic.   Cardiovascular: Normal rate, regular rhythm, S1 normal, S2 normal and normal heart sounds.   Pulmonary/Chest: Effort normal and breath sounds normal.   Neurological: He is alert.   Skin: Skin is warm.   Psychiatric: He has a normal mood and affect.   Vitals reviewed.      Procedures    Assessment/Plan   Kings was seen today for hypertension.    Diagnoses and all orders for this visit:    Essential hypertension  Comments:  BP is good. Drinking more water.    Class 2 obesity due to excess calories without serious comorbidity with body mass " index (BMI) of 36.0 to 36.9 in adult  Comments:  Gets 15,000 steps per day. He walks every evening.               Reviewed labs. Calcium is nl. BP is good. Sees me for physical in December. Recc flu shot and Shingrix at drug store.  No follow-ups on file.

## 2020-08-26 DIAGNOSIS — E78.00 HYPERCHOLESTEROLEMIA: ICD-10-CM

## 2020-08-26 DIAGNOSIS — I10 ESSENTIAL HYPERTENSION: ICD-10-CM

## 2020-08-26 RX ORDER — ATORVASTATIN CALCIUM 40 MG/1
TABLET, FILM COATED ORAL
Qty: 30 TABLET | Refills: 2 | Status: SHIPPED | OUTPATIENT
Start: 2020-08-26 | End: 2020-11-23

## 2020-08-26 RX ORDER — AMLODIPINE BESYLATE 10 MG/1
TABLET ORAL
Qty: 30 TABLET | Refills: 2 | Status: SHIPPED | OUTPATIENT
Start: 2020-08-26 | End: 2020-12-09 | Stop reason: SDUPTHER

## 2020-10-12 RX ORDER — OMEPRAZOLE 20 MG/1
CAPSULE, DELAYED RELEASE ORAL
Qty: 30 CAPSULE | Refills: 8 | Status: SHIPPED | OUTPATIENT
Start: 2020-10-12 | End: 2021-07-19

## 2020-10-29 DIAGNOSIS — I10 ESSENTIAL HYPERTENSION: ICD-10-CM

## 2020-10-29 RX ORDER — LOSARTAN POTASSIUM 100 MG/1
TABLET ORAL
Qty: 90 TABLET | Refills: 0 | Status: SHIPPED | OUTPATIENT
Start: 2020-10-29 | End: 2021-01-28

## 2020-11-22 DIAGNOSIS — E78.00 HYPERCHOLESTEROLEMIA: ICD-10-CM

## 2020-11-23 RX ORDER — ATORVASTATIN CALCIUM 40 MG/1
TABLET, FILM COATED ORAL
Qty: 30 TABLET | Refills: 2 | Status: SHIPPED | OUTPATIENT
Start: 2020-11-23 | End: 2021-02-26

## 2020-11-30 ENCOUNTER — RESULTS ENCOUNTER (OUTPATIENT)
Dept: INTERNAL MEDICINE | Facility: CLINIC | Age: 55
End: 2020-11-30

## 2020-11-30 DIAGNOSIS — R73.03 PRE-DIABETES: ICD-10-CM

## 2020-11-30 DIAGNOSIS — Z12.5 PROSTATE CANCER SCREENING: ICD-10-CM

## 2020-11-30 DIAGNOSIS — I10 ESSENTIAL HYPERTENSION: ICD-10-CM

## 2020-12-08 LAB
ALBUMIN SERPL-MCNC: 5.1 G/DL (ref 3.5–5.2)
ALBUMIN/GLOB SERPL: 2.3 G/DL
ALP SERPL-CCNC: 52 U/L (ref 39–117)
ALT SERPL-CCNC: 24 U/L (ref 1–41)
AST SERPL-CCNC: 19 U/L (ref 1–40)
BILIRUB SERPL-MCNC: 0.6 MG/DL (ref 0–1.2)
BUN SERPL-MCNC: 16 MG/DL (ref 6–20)
BUN/CREAT SERPL: 15.5 (ref 7–25)
CALCIUM SERPL-MCNC: 10 MG/DL (ref 8.6–10.5)
CHLORIDE SERPL-SCNC: 101 MMOL/L (ref 98–107)
CHOLEST SERPL-MCNC: 189 MG/DL (ref 0–200)
CHOLEST/HDLC SERPL: 3.94 {RATIO}
CO2 SERPL-SCNC: 28.9 MMOL/L (ref 22–29)
CREAT SERPL-MCNC: 1.03 MG/DL (ref 0.76–1.27)
GLOBULIN SER CALC-MCNC: 2.2 GM/DL
GLUCOSE SERPL-MCNC: 105 MG/DL (ref 65–99)
HBA1C MFR BLD: 5.7 % (ref 4.8–5.6)
HDLC SERPL-MCNC: 48 MG/DL (ref 40–60)
LDLC SERPL CALC-MCNC: 114 MG/DL (ref 0–100)
POTASSIUM SERPL-SCNC: 5 MMOL/L (ref 3.5–5.2)
PROT SERPL-MCNC: 7.3 G/DL (ref 6–8.5)
PSA SERPL-MCNC: 0.58 NG/ML (ref 0–4)
SODIUM SERPL-SCNC: 142 MMOL/L (ref 136–145)
TRIGL SERPL-MCNC: 150 MG/DL (ref 0–150)
VLDLC SERPL CALC-MCNC: 27 MG/DL (ref 5–40)

## 2020-12-09 DIAGNOSIS — I10 ESSENTIAL HYPERTENSION: ICD-10-CM

## 2020-12-09 RX ORDER — AMLODIPINE BESYLATE 10 MG/1
10 TABLET ORAL DAILY
Qty: 90 TABLET | Refills: 0 | Status: SHIPPED | OUTPATIENT
Start: 2020-12-09 | End: 2021-03-10

## 2021-01-28 DIAGNOSIS — E78.00 HYPERCHOLESTEROLEMIA: ICD-10-CM

## 2021-01-28 DIAGNOSIS — I10 ESSENTIAL HYPERTENSION: ICD-10-CM

## 2021-01-28 RX ORDER — LOSARTAN POTASSIUM 100 MG/1
TABLET ORAL
Qty: 90 TABLET | Refills: 0 | Status: SHIPPED | OUTPATIENT
Start: 2021-01-28 | End: 2021-02-01

## 2021-01-28 RX ORDER — FENOFIBRATE 145 MG/1
TABLET, COATED ORAL
Qty: 90 TABLET | Refills: 2 | Status: SHIPPED | OUTPATIENT
Start: 2021-01-28 | End: 2021-02-14 | Stop reason: SDUPTHER

## 2021-01-31 DIAGNOSIS — I10 ESSENTIAL HYPERTENSION: ICD-10-CM

## 2021-02-01 RX ORDER — LOSARTAN POTASSIUM 100 MG/1
TABLET ORAL
Qty: 90 TABLET | Refills: 3 | Status: SHIPPED | OUTPATIENT
Start: 2021-02-01 | End: 2021-02-14 | Stop reason: SDUPTHER

## 2021-02-08 ENCOUNTER — TELEPHONE (OUTPATIENT)
Dept: INTERNAL MEDICINE | Facility: CLINIC | Age: 56
End: 2021-02-08

## 2021-02-14 DIAGNOSIS — E78.00 HYPERCHOLESTEROLEMIA: ICD-10-CM

## 2021-02-14 DIAGNOSIS — I10 ESSENTIAL HYPERTENSION: ICD-10-CM

## 2021-02-15 RX ORDER — LOSARTAN POTASSIUM 100 MG/1
100 TABLET ORAL DAILY
Qty: 90 TABLET | Refills: 3 | Status: SHIPPED | OUTPATIENT
Start: 2021-02-15 | End: 2022-05-16 | Stop reason: SDUPTHER

## 2021-02-15 RX ORDER — FENOFIBRATE 145 MG/1
145 TABLET, COATED ORAL DAILY
Qty: 90 TABLET | Refills: 2 | Status: SHIPPED | OUTPATIENT
Start: 2021-02-15 | End: 2022-05-16 | Stop reason: SDUPTHER

## 2021-02-26 DIAGNOSIS — E78.00 HYPERCHOLESTEROLEMIA: ICD-10-CM

## 2021-02-26 RX ORDER — ATORVASTATIN CALCIUM 40 MG/1
TABLET, FILM COATED ORAL
Qty: 90 TABLET | Refills: 1 | Status: SHIPPED | OUTPATIENT
Start: 2021-02-26 | End: 2021-08-24

## 2021-03-10 DIAGNOSIS — I10 ESSENTIAL HYPERTENSION: ICD-10-CM

## 2021-03-10 RX ORDER — AMLODIPINE BESYLATE 10 MG/1
TABLET ORAL
Qty: 30 TABLET | Refills: 2 | Status: SHIPPED | OUTPATIENT
Start: 2021-03-10 | End: 2021-06-07

## 2021-03-11 ENCOUNTER — OFFICE VISIT (OUTPATIENT)
Dept: SLEEP MEDICINE | Facility: HOSPITAL | Age: 56
End: 2021-03-11

## 2021-03-11 VITALS
BODY MASS INDEX: 37.28 KG/M2 | SYSTOLIC BLOOD PRESSURE: 147 MMHG | WEIGHT: 246 LBS | HEIGHT: 68 IN | HEART RATE: 71 BPM | OXYGEN SATURATION: 97 % | DIASTOLIC BLOOD PRESSURE: 90 MMHG

## 2021-03-11 DIAGNOSIS — E66.09 CLASS 2 OBESITY DUE TO EXCESS CALORIES WITHOUT SERIOUS COMORBIDITY WITH BODY MASS INDEX (BMI) OF 36.0 TO 36.9 IN ADULT: ICD-10-CM

## 2021-03-11 DIAGNOSIS — Z99.89 OSA ON CPAP: Primary | ICD-10-CM

## 2021-03-11 DIAGNOSIS — G47.33 OSA ON CPAP: Primary | ICD-10-CM

## 2021-03-11 PROCEDURE — 99213 OFFICE O/P EST LOW 20 MIN: CPT | Performed by: INTERNAL MEDICINE

## 2021-03-11 PROCEDURE — G0463 HOSPITAL OUTPT CLINIC VISIT: HCPCS

## 2021-03-11 NOTE — PROGRESS NOTES
[facility]  [address] [place] [address]  Phone   Fax       SLEEP CLINIC FOLLOW UP PROGRESS NOTE.    Kings Mera  1965  55 y.o.  male      PCP: Moncho Gonzalez MD      Date of visit: 3/11/2021    Chief Complaint   Patient presents with   • Sleep Apnea   • Follow-up       HPI:  This is a 55 y.o. years old patient who has a history of obstructive sleep apnea is here for  the annual compliance follow-up.  Sleep apnea is mild in severity with a AHI of 9/hr. It was diagnosed with a sleep study.  Patient is using positive airway pressure therapy and the symptoms of snoring and daytime excessive sleepiness have improved significantly on the therapy. Normally goes to bed at 1030 and wakes up at 5:30 AM.  The patient wakes up 1-2 times during the night and has no problem going back to sleep.  Feels refreshed after waking up.  Patient also denies headaches and nasal congestion.     Past Medical History:   Diagnosis Date   • Alopecia areata    • Abck esophagus    • Diverticulitis    • Fatty liver    • Gastroesophageal reflux    • Hypercholesterolemia    • Hypertension    • Obesity    • DIMAS on CPAP    • Tinea versicolor    • Tinnitus        MEDICATIONS: reviewed by me    Current Outpatient Medications:   •  amLODIPine (NORVASC) 10 MG tablet, TAKE ONE TABLET BY MOUTH DAILY, Disp: 30 tablet, Rfl: 2  •  atorvastatin (LIPITOR) 40 MG tablet, TAKE ONE TABLET BY MOUTH DAILY, Disp: 90 tablet, Rfl: 1  •  fenofibrate (TRICOR) 145 MG tablet, Take 1 tablet by mouth Daily., Disp: 90 tablet, Rfl: 2  •  losartan (COZAAR) 100 MG tablet, Take 1 tablet by mouth Daily., Disp: 90 tablet, Rfl: 3  •  Omega-3 Fatty Acids (FISH OIL) 1000 MG capsule capsule, Take  by mouth Daily With Breakfast., Disp: , Rfl:   •  omeprazole (priLOSEC) 20 MG capsule, TAKE ONE CAPSULE BY MOUTH DAILY, Disp: 30 capsule, Rfl: 8    No Known Allergies reviewed by me    SOCIAL, FAMILY HISTORY:  "Medical records are reviewed and noted by me.  History of smoking no  History of alcohol use 6/week  Caffeine use 1 cup of coffee a day    REVIEW OF SYSTEMS:   [place] Sleepiness Scale :Total score: 3   Snoring: Resolved  Morning headache: No  Nasal congestion: No  Leg movements: No  Heart burn no    PHYSICAL EXAMINATION:  CONSTITUTIONAL:  Vitals:    03/11/21 0900   BP: 147/90   Pulse: 71   SpO2: 97%   Weight: 112 kg (246 lb)   Height: 172.7 cm (68\")    Body mass index is 37.4 kg/m².    EYES: pupils are round equal and reacting to light and accommodation,   NOSE: nasal passages are clear, no nasal polyps, septum in the midline.  THROAT: throat is clear, oral airway Mallampati class 3  RESP SYSTEM: Breath sounds are normal, no wheezes or crackles  CARDIOVASULAR: Heart rate is regular without murmur. No edema  Musculosketal: No cyanosis, No clubbing, gait is normal      The Smart card downloaded on 3/11/2021 has been reviewed independently by me for compliance and discussed the data with the patient  Compliance; 100%  > 4 hr use, 100%  Average use of the device 8 hours and 22 minutes per night  Residual AHI: 0.7 /hr (normal less than 5)  Mask type: Nasal pillows  DME: [facility] oxygen    ASSESSMENT AND PLAN:  · Obstructive sleep apnea ( G 47.33).  The symptoms of sleep apnea have improved with the device and the treatment.  Patient's compliance with the device is excellent for treatment of sleep apnea.  I have independently reviewed the smart card down load and discussed with the patient the download data and encouarged the patient to continue to use the device.The residual AHI is acceptable. The device is benefiting the patient and the device is medically necessary. The patient is also instructed to get the supplies from the [facility] and and change them on a regular basis.  A prescription for supplies has been sent to the [facility].  I have also discussed the good sleep hygiene habits and adequate amount of " sleep needed for good health.  · Obesity, patient's BMI is Body mass index is 37.4 kg/m²..  I have discussed weight reduction and the health benefits.  I have also discuss the relationship between the weight and sleep apnea and encouraged the patient to lose weight which is beneficial in treating sleep apnea. Discussed diet and exercise with the patient.  · Return to clinic in 12 months for follow-up and patient also instructed to call the sleep clinic for any problems.  All the patient's questions were answered.        Lg Estrada MD  Sleep Medicine  Medical Director for [facility]  3/11/2021

## 2021-04-29 ENCOUNTER — OFFICE VISIT (OUTPATIENT)
Dept: INTERNAL MEDICINE | Facility: CLINIC | Age: 56
End: 2021-04-29

## 2021-04-29 VITALS
DIASTOLIC BLOOD PRESSURE: 76 MMHG | HEIGHT: 68 IN | BODY MASS INDEX: 36.68 KG/M2 | WEIGHT: 242 LBS | SYSTOLIC BLOOD PRESSURE: 114 MMHG | TEMPERATURE: 97.8 F

## 2021-04-29 DIAGNOSIS — Z00.00 WELLNESS EXAMINATION: Primary | ICD-10-CM

## 2021-04-29 DIAGNOSIS — I10 ESSENTIAL HYPERTENSION: Chronic | ICD-10-CM

## 2021-04-29 DIAGNOSIS — E78.00 HYPERCHOLESTEROLEMIA: Chronic | ICD-10-CM

## 2021-04-29 DIAGNOSIS — K22.70 BARRETT'S ESOPHAGUS WITHOUT DYSPLASIA: ICD-10-CM

## 2021-04-29 DIAGNOSIS — K63.5 POLYP OF COLON, UNSPECIFIED PART OF COLON, UNSPECIFIED TYPE: ICD-10-CM

## 2021-04-29 DIAGNOSIS — R73.03 PRE-DIABETES: Chronic | ICD-10-CM

## 2021-04-29 PROBLEM — E83.52 HYPERCALCEMIA: Status: RESOLVED | Noted: 2020-07-21 | Resolved: 2021-04-29

## 2021-04-29 PROCEDURE — 99396 PREV VISIT EST AGE 40-64: CPT | Performed by: INTERNAL MEDICINE

## 2021-04-29 NOTE — PROGRESS NOTES
Subjective        Chief Complaint   Patient presents with   • Annual Exam           Kings Mera is a 55 y.o. male who presents for    Patient Active Problem List   Diagnosis   • Essential hypertension   • DIMAS on CPAP   • Hypercholesterolemia   • Numbness and tingling in left hand   • Class 2 obesity due to excess calories without serious comorbidity with body mass index (BMI) of 36.0 to 36.9 in adult   • Pre-diabetes   • Back esophagus       History of Present Illness     He feels better with days being longer. He is walking more. He averages 17K steps per day. He uses his CPAP. He has been checking his BP and it was 120/70 last night.  No Known Allergies    Current Outpatient Medications on File Prior to Visit   Medication Sig Dispense Refill   • amLODIPine (NORVASC) 10 MG tablet TAKE ONE TABLET BY MOUTH DAILY 30 tablet 2   • atorvastatin (LIPITOR) 40 MG tablet TAKE ONE TABLET BY MOUTH DAILY 90 tablet 1   • fenofibrate (TRICOR) 145 MG tablet Take 1 tablet by mouth Daily. 90 tablet 2   • losartan (COZAAR) 100 MG tablet Take 1 tablet by mouth Daily. 90 tablet 3   • Omega-3 Fatty Acids (FISH OIL) 1000 MG capsule capsule Take  by mouth Daily With Breakfast.     • omeprazole (priLOSEC) 20 MG capsule TAKE ONE CAPSULE BY MOUTH DAILY 30 capsule 8     No current facility-administered medications on file prior to visit.       Past Medical History:   Diagnosis Date   • Alopecia areata    • Back esophagus    • Diverticulitis    • Fatty liver    • Gastroesophageal reflux    • Hypercalcemia 7/21/2020   • Obesity    • Tinea versicolor    • Tinnitus        Past Surgical History:   Procedure Laterality Date   • COLONOSCOPY  08/28/2015   • UPPER GASTROINTESTINAL ENDOSCOPY  2019       Family History   Problem Relation Age of Onset   • Cancer Mother    • Other Father         head injury       Social History     Socioeconomic History   • Marital status:      Spouse name: Not on file   • Number of children: Not on file   •  "Years of education: Not on file   • Highest education level: Not on file   Tobacco Use   • Smoking status: Former Smoker     Packs/day: 1.00     Years: 4.00     Pack years: 4.00     Types: Cigarettes     Quit date:      Years since quittin.3   • Smokeless tobacco: Never Used   Substance and Sexual Activity   • Alcohol use: Yes     Comment: 6-8 beers per week   • Drug use: No   • Sexual activity: Defer           The following portions of the patient's history were reviewed and updated as appropriate: problem list, allergies, current medications, past medical history, past family history, past social history and past surgical history.    Review of Systems   Constitutional: Negative for chills, fever and unexpected weight loss.   HENT: Negative for postnasal drip and sore throat.    Eyes: Negative for blurred vision.   Respiratory: Negative for cough, shortness of breath and wheezing.    Cardiovascular: Negative for chest pain and leg swelling.   Gastrointestinal: Negative for abdominal pain, blood in stool, nausea, vomiting and GERD.   Endocrine: Negative for polyuria.   Genitourinary: Negative for dysuria, frequency and hematuria.   Musculoskeletal: Negative for gait problem.   Skin: Negative for rash.   Allergic/Immunologic: Negative for immunocompromised state.   Neurological: Negative for weakness.   Hematological: Does not bruise/bleed easily.   Psychiatric/Behavioral: Negative for depressed mood. The patient is not nervous/anxious.        Immunization History   Administered Date(s) Administered   • Hepatitis A 11/10/2017, 2019   • Hepatitis B 11/10/2017, 2017, 2019   • Tdap 2010, 2020   • Zostavax 2014       Objective   Vitals:    21 1313   BP: 114/76   Temp: 97.8 °F (36.6 °C)   Weight: 110 kg (242 lb)   Height: 172.7 cm (68\")     Body mass index is 36.8 kg/m².  Physical Exam  Vitals reviewed.   Constitutional:       Appearance: He is well-developed.   HENT:     "  Head: Normocephalic and atraumatic.      Mouth/Throat:      Mouth: Mucous membranes are moist.      Pharynx: Oropharynx is clear.   Eyes:      Extraocular Movements: Extraocular movements intact.      Conjunctiva/sclera: Conjunctivae normal.      Pupils: Pupils are equal, round, and reactive to light.   Neck:      Thyroid: No thyromegaly.      Vascular: No carotid bruit.   Cardiovascular:      Rate and Rhythm: Normal rate and regular rhythm.      Heart sounds: Normal heart sounds. No murmur heard.     Pulmonary:      Effort: Pulmonary effort is normal.      Breath sounds: Normal breath sounds.   Abdominal:      General: There is no distension.      Palpations: Abdomen is soft. There is no mass.      Tenderness: There is no abdominal tenderness. There is no rebound.   Genitourinary:     Prostate: Normal.      Rectum: Normal.   Musculoskeletal:      Cervical back: Neck supple.   Lymphadenopathy:      Cervical: No cervical adenopathy.   Skin:     General: Skin is warm.   Neurological:      Mental Status: He is alert.   Psychiatric:         Behavior: Behavior normal.         Procedures    Assessment/Plan   Diagnoses and all orders for this visit:    1. Wellness examination (Primary)    2. Back's esophagus without dysplasia  -     Ambulatory Referral to Gastroenterology    3. Polyp of colon, unspecified part of colon, unspecified type  -     Ambulatory Referral to Gastroenterology    4. Essential hypertension  -     Comprehensive Metabolic Panel; Future    5. Hypercholesterolemia    6. Pre-diabetes  -     Hemoglobin A1c; Future             Reviewed labs from December. Answered questions about COVID shots. Recc Shingrix.    Get into GI to discuss when he needs next scopes.    Discussed exercising 150 minutes per week.     Return in about 6 months (around 10/29/2021) for Lab Before FUP.

## 2021-06-05 DIAGNOSIS — I10 ESSENTIAL HYPERTENSION: ICD-10-CM

## 2021-06-07 RX ORDER — AMLODIPINE BESYLATE 10 MG/1
TABLET ORAL
Qty: 90 TABLET | Refills: 1 | Status: SHIPPED | OUTPATIENT
Start: 2021-06-07 | End: 2021-12-02

## 2021-06-23 NOTE — PROGRESS NOTES
"Chief Complaint   Patient presents with   • GI Problem     Back's esophagus, history of colon polyps           History of Present Illness  Patient for follow-up.  He has a history of Back's esophagus and colon polyps.  He will be due for surveillance in April 2022.  EGD showed 1 cm hiatal hernia.  He had a colonoscopy 2015 with a single polyp.    Patient denies any change in bowel habits or rectal bleeding.    He continues on omeprazole for GERD. He denies any dysphagia.    He denies family history of colon cancer.     Result Review :       ENDOSCOPY, INT (04/04/2019)  SCANNED - COLONOSCOPY (08/28/2015)  SCANNED PATHOLOGY (04/04/2019)      Vital Signs:   /90   Pulse 75   Temp 98 °F (36.7 °C)   Resp 20   Ht 174 cm (68.5\")   Wt 115 kg (253 lb 3.2 oz)   SpO2 99%   BMI 37.94 kg/m²     Body mass index is 37.94 kg/m².     Physical Exam  Vitals reviewed.   Constitutional:       Appearance: Normal appearance.   Abdominal:      General: Bowel sounds are normal. There is no distension.      Palpations: Abdomen is soft. Abdomen is not rigid. There is no mass or pulsatile mass.      Tenderness: There is no abdominal tenderness. There is no guarding or rebound.           Assessment and Plan    Diagnoses and all orders for this visit:    1. Back's esophagus without dysplasia (Primary)    2. Hiatal hernia    3. History of colon polyps    4. Gastroesophageal reflux disease without esophagitis         BRIEF SUMMARY  Patient for follow-up.  His reflux is well controlled.  He'll be due for his surveillance of his Back's esophagus next April.  I will check his pathology report from prior colonoscopy in 2015 to decide if he is due now or if he can wait till 2025 for his next colonoscopy.    I have reviewed and confirmed the accuracy of the HPI and Assessment and Plan as documented by the APRN SHENA Lester        Follow Up   No follow-ups on file.    Patient Instructions   For surveillance of Back's " esophagus, EGD recommended at 3-year interval, due April 2022.     Will review pathology report from prior colonoscopy. If polyp was hyperplastic, colonoscopy will be due in 2025. If polyp was adenomatous, colonoscopy is due and we will schedule.     Documentation by Emily CHATTERJEE acting as a scribe in the following sections on behalf of the billable provider: HPI, ROS, assessment, & plan.

## 2021-06-24 ENCOUNTER — OFFICE VISIT (OUTPATIENT)
Dept: GASTROENTEROLOGY | Facility: CLINIC | Age: 56
End: 2021-06-24

## 2021-06-24 ENCOUNTER — TELEPHONE (OUTPATIENT)
Dept: GASTROENTEROLOGY | Facility: CLINIC | Age: 56
End: 2021-06-24

## 2021-06-24 VITALS
WEIGHT: 253.2 LBS | TEMPERATURE: 98 F | SYSTOLIC BLOOD PRESSURE: 160 MMHG | DIASTOLIC BLOOD PRESSURE: 90 MMHG | BODY MASS INDEX: 37.5 KG/M2 | OXYGEN SATURATION: 99 % | HEIGHT: 69 IN | RESPIRATION RATE: 20 BRPM | HEART RATE: 75 BPM

## 2021-06-24 DIAGNOSIS — K22.70 BARRETT'S ESOPHAGUS WITHOUT DYSPLASIA: Primary | ICD-10-CM

## 2021-06-24 DIAGNOSIS — Z86.010 HISTORY OF COLON POLYPS: ICD-10-CM

## 2021-06-24 DIAGNOSIS — K44.9 HIATAL HERNIA: ICD-10-CM

## 2021-06-24 DIAGNOSIS — K21.9 GASTROESOPHAGEAL REFLUX DISEASE WITHOUT ESOPHAGITIS: ICD-10-CM

## 2021-06-24 PROCEDURE — 99203 OFFICE O/P NEW LOW 30 MIN: CPT | Performed by: INTERNAL MEDICINE

## 2021-06-24 NOTE — PATIENT INSTRUCTIONS
For surveillance of Back's esophagus, EGD recommended at 3-year interval, due April 2022.     Will review pathology report from prior colonoscopy. If polyp was hyperplastic, colonoscopy will be due in 2025. If polyp was adenomatous, colonoscopy is due and we will schedule.

## 2021-06-24 NOTE — TELEPHONE ENCOUNTER
----- Message from SHENA Lester sent at 6/24/2021 12:56 PM EDT -----  Please place in recall for EGD with [facility] April 2022 for Back's

## 2021-07-19 RX ORDER — OMEPRAZOLE 20 MG/1
CAPSULE, DELAYED RELEASE ORAL
Qty: 30 CAPSULE | Refills: 7 | Status: SHIPPED | OUTPATIENT
Start: 2021-07-19 | End: 2022-03-21

## 2021-08-24 DIAGNOSIS — E78.00 HYPERCHOLESTEROLEMIA: ICD-10-CM

## 2021-08-24 RX ORDER — ATORVASTATIN CALCIUM 40 MG/1
TABLET, FILM COATED ORAL
Qty: 90 TABLET | Refills: 1 | Status: SHIPPED | OUTPATIENT
Start: 2021-08-24 | End: 2022-03-01

## 2021-10-21 DIAGNOSIS — R73.03 PRE-DIABETES: Chronic | ICD-10-CM

## 2021-10-21 DIAGNOSIS — I10 ESSENTIAL HYPERTENSION: Chronic | ICD-10-CM

## 2021-10-23 LAB
ALBUMIN SERPL-MCNC: 5 G/DL (ref 3.8–4.9)
ALBUMIN/GLOB SERPL: 1.9 {RATIO} (ref 1.2–2.2)
ALP SERPL-CCNC: 54 IU/L (ref 44–121)
ALT SERPL-CCNC: 41 IU/L (ref 0–44)
AST SERPL-CCNC: 40 IU/L (ref 0–40)
BILIRUB SERPL-MCNC: 0.9 MG/DL (ref 0–1.2)
BUN SERPL-MCNC: 11 MG/DL (ref 6–24)
BUN/CREAT SERPL: 10 (ref 9–20)
CALCIUM SERPL-MCNC: 10.3 MG/DL (ref 8.7–10.2)
CHLORIDE SERPL-SCNC: 102 MMOL/L (ref 96–106)
CO2 SERPL-SCNC: 22 MMOL/L (ref 20–29)
CREAT SERPL-MCNC: 1.1 MG/DL (ref 0.76–1.27)
GLOBULIN SER CALC-MCNC: 2.7 G/DL (ref 1.5–4.5)
GLUCOSE SERPL-MCNC: 128 MG/DL (ref 65–99)
HBA1C MFR BLD: 5.7 % (ref 4.8–5.6)
POTASSIUM SERPL-SCNC: 3.9 MMOL/L (ref 3.5–5.2)
PROT SERPL-MCNC: 7.7 G/DL (ref 6–8.5)
SODIUM SERPL-SCNC: 143 MMOL/L (ref 134–144)

## 2021-11-01 ENCOUNTER — OFFICE VISIT (OUTPATIENT)
Dept: INTERNAL MEDICINE | Facility: CLINIC | Age: 56
End: 2021-11-01

## 2021-11-01 VITALS
HEIGHT: 69 IN | SYSTOLIC BLOOD PRESSURE: 122 MMHG | WEIGHT: 257 LBS | DIASTOLIC BLOOD PRESSURE: 76 MMHG | BODY MASS INDEX: 38.06 KG/M2 | TEMPERATURE: 97.8 F

## 2021-11-01 DIAGNOSIS — I10 ESSENTIAL HYPERTENSION: Primary | Chronic | ICD-10-CM

## 2021-11-01 DIAGNOSIS — F41.9 ANXIETY: ICD-10-CM

## 2021-11-01 DIAGNOSIS — R73.03 PRE-DIABETES: Chronic | ICD-10-CM

## 2021-11-01 PROCEDURE — 99214 OFFICE O/P EST MOD 30 MIN: CPT | Performed by: INTERNAL MEDICINE

## 2021-11-01 PROCEDURE — 90686 IIV4 VACC NO PRSV 0.5 ML IM: CPT | Performed by: INTERNAL MEDICINE

## 2021-11-01 PROCEDURE — 90471 IMMUNIZATION ADMIN: CPT | Performed by: INTERNAL MEDICINE

## 2021-11-01 RX ORDER — FLUOXETINE HYDROCHLORIDE 20 MG/1
20 CAPSULE ORAL DAILY
Qty: 30 CAPSULE | Refills: 3 | Status: SHIPPED | OUTPATIENT
Start: 2021-11-01 | End: 2022-02-25

## 2021-11-01 NOTE — PROGRESS NOTES
Subjective        Chief Complaint   Patient presents with   • Hypertension           Kings Mera is a 55 y.o. male who presents for    Patient Active Problem List   Diagnosis   • Essential hypertension   • DIMAS on CPAP   • Hypercholesterolemia   • Numbness and tingling in left hand   • Class 2 obesity due to excess calories without serious comorbidity with body mass index (BMI) of 36.0 to 36.9 in adult   • Pre-diabetes   • Back esophagus       History of Present Illness     He has gained at least 10 pounds. He has been exercising. He denies chest pain with exertion.  His SBP has been up to 170. His work has been stressing him out. He gags in the mornings for the last 2 months. He feels overwhelmed and he hyperventilates. He has no problems with eating or swallowing. He has tinnitus. He saw the ENT recently. He can feel depressed. He denies SI. He has been taking care of his step father. He has 2-4 beers per night to help himself relax.  No Known Allergies    Current Outpatient Medications on File Prior to Visit   Medication Sig Dispense Refill   • amLODIPine (NORVASC) 10 MG tablet TAKE ONE TABLET BY MOUTH DAILY 90 tablet 1   • atorvastatin (LIPITOR) 40 MG tablet TAKE ONE TABLET BY MOUTH DAILY 90 tablet 1   • fenofibrate (TRICOR) 145 MG tablet Take 1 tablet by mouth Daily. 90 tablet 2   • losartan (COZAAR) 100 MG tablet Take 1 tablet by mouth Daily. 90 tablet 3   • Omega-3 Fatty Acids (FISH OIL) 1000 MG capsule capsule Take  by mouth Daily With Breakfast.     • omeprazole (priLOSEC) 20 MG capsule TAKE ONE CAPSULE BY MOUTH DAILY 30 capsule 7     No current facility-administered medications on file prior to visit.       Past Medical History:   Diagnosis Date   • Alopecia areata    • Back esophagus    • Diverticulitis    • Fatty liver    • Gastroesophageal reflux    • Hypercalcemia 7/21/2020   • Obesity    • Tinea versicolor    • Tinnitus        Past Surgical History:   Procedure Laterality Date   • COLONOSCOPY   "2015   • UPPER GASTROINTESTINAL ENDOSCOPY  2019       Family History   Problem Relation Age of Onset   • Cancer Mother    • Other Father         head injury   • Colon cancer Neg Hx    • Colon polyps Neg Hx        Social History     Socioeconomic History   • Marital status:    Tobacco Use   • Smoking status: Former Smoker     Packs/day: 1.00     Years: 4.00     Pack years: 4.00     Types: Cigarettes     Quit date:      Years since quittin.8   • Smokeless tobacco: Never Used   Substance and Sexual Activity   • Alcohol use: Yes     Comment: 6-8 beers per week   • Drug use: No   • Sexual activity: Defer           The following portions of the patient's history were reviewed and updated as appropriate: problem list, allergies, current medications, past medical history, past family history, past social history and past surgical history.    Review of Systems    Immunization History   Administered Date(s) Administered   • COVID-19 (PFIZER) 2021, 2021   • FluLaval/Fluarix/Fluzone >6 2021   • Hepatitis A 11/10/2017, 2019   • Hepatitis B 11/10/2017, 2017, 2019   • Tdap 2010, 2020   • Zostavax 2014       Objective   Vitals:    21 0928   BP: 122/76   Temp: 97.8 °F (36.6 °C)   Weight: 117 kg (257 lb)   Height: 174 cm (68.5\")     Body mass index is 38.51 kg/m².  Physical Exam  Vitals reviewed.   Constitutional:       Appearance: He is well-developed.   HENT:      Head: Normocephalic and atraumatic.   Cardiovascular:      Rate and Rhythm: Normal rate and regular rhythm.      Heart sounds: Normal heart sounds, S1 normal and S2 normal.   Pulmonary:      Effort: Pulmonary effort is normal.      Breath sounds: Normal breath sounds.   Skin:     General: Skin is warm.   Neurological:      Mental Status: He is alert.   Psychiatric:         Behavior: Behavior normal.         Procedures    Assessment/Plan   Diagnoses and all orders for this visit:    1. " Essential hypertension (Primary)    2. Anxiety  -     FLUoxetine (PROzac) 20 MG capsule; Take 1 capsule by mouth Daily.  Dispense: 30 capsule; Refill: 3    3. Pre-diabetes    Other orders  -     FluLaval/Fluarix/Fluzone >6 Months ([id number])             BP is good. His cscope is good for 10 years based upon hyperplastic polyp.  Reviewed CMP and a1c. [facility] counselor and decrease beer.  Start Prozac; explained side effects. He assures me he won't hurt himself.Return in about 6 weeks (around 12/13/2021).

## 2021-12-02 DIAGNOSIS — I10 ESSENTIAL HYPERTENSION: ICD-10-CM

## 2021-12-02 RX ORDER — AMLODIPINE BESYLATE 10 MG/1
TABLET ORAL
Qty: 90 TABLET | Refills: 1 | Status: SHIPPED | OUTPATIENT
Start: 2021-12-02 | End: 2022-05-20 | Stop reason: SDUPTHER

## 2022-02-22 DIAGNOSIS — E78.00 HYPERCHOLESTEROLEMIA: ICD-10-CM

## 2022-02-22 RX ORDER — ATORVASTATIN CALCIUM 40 MG/1
TABLET, FILM COATED ORAL
Qty: 90 TABLET | Refills: 1 | OUTPATIENT
Start: 2022-02-22

## 2022-02-25 ENCOUNTER — OFFICE VISIT (OUTPATIENT)
Dept: INTERNAL MEDICINE | Facility: CLINIC | Age: 57
End: 2022-02-25

## 2022-02-25 VITALS
TEMPERATURE: 97.8 F | SYSTOLIC BLOOD PRESSURE: 140 MMHG | WEIGHT: 271.4 LBS | BODY MASS INDEX: 40.2 KG/M2 | DIASTOLIC BLOOD PRESSURE: 82 MMHG | HEART RATE: 72 BPM | HEIGHT: 69 IN

## 2022-02-25 DIAGNOSIS — Z12.5 PROSTATE CANCER SCREENING: ICD-10-CM

## 2022-02-25 DIAGNOSIS — I10 ESSENTIAL HYPERTENSION: Primary | Chronic | ICD-10-CM

## 2022-02-25 DIAGNOSIS — E78.00 HYPERCHOLESTEROLEMIA: Chronic | ICD-10-CM

## 2022-02-25 DIAGNOSIS — R73.03 PRE-DIABETES: Chronic | ICD-10-CM

## 2022-02-25 PROCEDURE — 99214 OFFICE O/P EST MOD 30 MIN: CPT | Performed by: INTERNAL MEDICINE

## 2022-02-25 NOTE — PROGRESS NOTES
Subjective        Chief Complaint   Patient presents with   • Hypertension           Kings Mera is a 56 y.o. male who presents for    Patient Active Problem List   Diagnosis   • Essential hypertension   • DIMAS on CPAP   • Hypercholesterolemia   • Numbness and tingling in left hand   • Class 2 obesity due to excess calories without serious comorbidity with body mass index (BMI) of 36.0 to 36.9 in adult   • Pre-diabetes   • Back esophagus       History of Present Illness     He is getting 10,000 steps per day. He thinks his last BP was 164/71. He stopped the Prozac since it made him tired. His anxiety is much better. He denies depression. He drinks 12 beers on Friday and Saturday. He had COVID in January; his main symptom was fatigue and headache. He just started the keto diet.  No Known Allergies    Current Outpatient Medications on File Prior to Visit   Medication Sig Dispense Refill   • amLODIPine (NORVASC) 10 MG tablet TAKE ONE TABLET BY MOUTH DAILY 90 tablet 1   • atorvastatin (LIPITOR) 40 MG tablet TAKE ONE TABLET BY MOUTH DAILY 90 tablet 1   • fenofibrate (TRICOR) 145 MG tablet Take 1 tablet by mouth Daily. 90 tablet 2   • losartan (COZAAR) 100 MG tablet Take 1 tablet by mouth Daily. 90 tablet 3   • Omega-3 Fatty Acids (FISH OIL) 1000 MG capsule capsule Take  by mouth Daily With Breakfast.     • omeprazole (priLOSEC) 20 MG capsule TAKE ONE CAPSULE BY MOUTH DAILY 30 capsule 7   • [DISCONTINUED] FLUoxetine (PROzac) 20 MG capsule Take 1 capsule by mouth Daily. 30 capsule 3     No current facility-administered medications on file prior to visit.       Past Medical History:   Diagnosis Date   • Alopecia areata    • Back esophagus    • COVID-19 01/2022   • Diverticulitis    • Fatty liver    • Gastroesophageal reflux    • Hypercalcemia 7/21/2020   • Hyperplastic colon polyp 2015   • Obesity    • Tinea versicolor    • Tinnitus        Past Surgical History:   Procedure Laterality Date   • COLONOSCOPY  08/28/2015  "  • UPPER GASTROINTESTINAL ENDOSCOPY  2019       Family History   Problem Relation Age of Onset   • Cancer Mother    • Other Father         head injury   • Colon cancer Neg Hx    • Colon polyps Neg Hx        Social History     Socioeconomic History   • Marital status:    Tobacco Use   • Smoking status: Former Smoker     Packs/day: 1.00     Years: 4.00     Pack years: 4.00     Types: Cigarettes     Quit date:      Years since quittin.1   • Smokeless tobacco: Never Used   Substance and Sexual Activity   • Alcohol use: Yes     Comment: 6-8 beers per week   • Drug use: No   • Sexual activity: Defer           The following portions of the patient's history were reviewed and updated as appropriate: problem list, allergies, current medications, past medical history, past family history, past social history and past surgical history.    Review of Systems    Immunization History   Administered Date(s) Administered   • COVID-19 (PFIZER) PURPLE CAP 2021, 2021   • FluLaval/Fluarix/Fluzone >6 2021   • Hepatitis A 11/10/2017, 2019   • Hepatitis B 11/10/2017, 2017, 2019   • Tdap 2010, 2020   • Zostavax 2014       Objective   Vitals:    22 0828   BP: 140/82   Pulse: 72   Temp: 97.8 °F (36.6 °C)   Weight: 123 kg (271 lb 6.4 oz)   Height: 174 cm (68.5\")     Body mass index is 40.66 kg/m².  Physical Exam  Vitals reviewed.   Constitutional:       Appearance: He is well-developed.   HENT:      Head: Normocephalic and atraumatic.   Cardiovascular:      Rate and Rhythm: Normal rate and regular rhythm.      Heart sounds: Normal heart sounds, S1 normal and S2 normal.   Pulmonary:      Effort: Pulmonary effort is normal.      Breath sounds: Normal breath sounds.   Skin:     General: Skin is warm.   Neurological:      Mental Status: He is alert.   Psychiatric:         Behavior: Behavior normal.         Procedures    Assessment/Plan   Diagnoses and all orders for this " visit:    1. Essential hypertension (Primary)  -     Comprehensive Metabolic Panel  -     Lipid Panel With / Chol / HDL Ratio    2. Pre-diabetes  -     Hemoglobin A1c    3. Prostate cancer screening  -     PSA Screen    4. Hypercholesterolemia               He has decided not to get COVID booster. Add Toprol. Anxiety much better. He has gained a significant amount of weight. He is walking now. Discussed decreasing beer. Check lipids today.  Return in about 2 months (around 5/2/2022) for Lab Today, Annual physical.

## 2022-02-26 LAB
ALBUMIN SERPL-MCNC: 4.7 G/DL (ref 3.8–4.9)
ALBUMIN/GLOB SERPL: 1.9 {RATIO} (ref 1.2–2.2)
ALP SERPL-CCNC: 64 IU/L (ref 44–121)
ALT SERPL-CCNC: 30 IU/L (ref 0–44)
AST SERPL-CCNC: 36 IU/L (ref 0–40)
BILIRUB SERPL-MCNC: 0.4 MG/DL (ref 0–1.2)
BUN SERPL-MCNC: 11 MG/DL (ref 6–24)
BUN/CREAT SERPL: 13 (ref 9–20)
CALCIUM SERPL-MCNC: 9.1 MG/DL (ref 8.7–10.2)
CHLORIDE SERPL-SCNC: 98 MMOL/L (ref 96–106)
CHOLEST SERPL-MCNC: 194 MG/DL (ref 100–199)
CHOLEST/HDLC SERPL: 6.3 RATIO (ref 0–5)
CO2 SERPL-SCNC: 23 MMOL/L (ref 20–29)
CREAT SERPL-MCNC: 0.84 MG/DL (ref 0.76–1.27)
GLOBULIN SER CALC-MCNC: 2.5 G/DL (ref 1.5–4.5)
GLUCOSE SERPL-MCNC: 115 MG/DL (ref 65–99)
HBA1C MFR BLD: 5.7 % (ref 4.8–5.6)
HDLC SERPL-MCNC: 31 MG/DL
LDLC SERPL CALC-MCNC: 122 MG/DL (ref 0–99)
POTASSIUM SERPL-SCNC: 3.9 MMOL/L (ref 3.5–5.2)
PROT SERPL-MCNC: 7.2 G/DL (ref 6–8.5)
PSA SERPL-MCNC: 0.6 NG/ML (ref 0–4)
SODIUM SERPL-SCNC: 141 MMOL/L (ref 134–144)
TRIGL SERPL-MCNC: 234 MG/DL (ref 0–149)
VLDLC SERPL CALC-MCNC: 41 MG/DL (ref 5–40)

## 2022-03-01 DIAGNOSIS — E78.00 HYPERCHOLESTEROLEMIA: ICD-10-CM

## 2022-03-01 RX ORDER — ATORVASTATIN CALCIUM 40 MG/1
TABLET, FILM COATED ORAL
Qty: 90 TABLET | Refills: 1 | Status: SHIPPED | OUTPATIENT
Start: 2022-03-01 | End: 2022-03-02 | Stop reason: SDUPTHER

## 2022-03-02 DIAGNOSIS — E78.00 HYPERCHOLESTEROLEMIA: ICD-10-CM

## 2022-03-02 RX ORDER — ATORVASTATIN CALCIUM 40 MG/1
40 TABLET, FILM COATED ORAL DAILY
Qty: 90 TABLET | Refills: 1 | Status: SHIPPED | OUTPATIENT
Start: 2022-03-02 | End: 2023-02-24

## 2022-03-10 ENCOUNTER — OFFICE VISIT (OUTPATIENT)
Dept: SLEEP MEDICINE | Facility: HOSPITAL | Age: 57
End: 2022-03-10

## 2022-03-10 VITALS
HEART RATE: 74 BPM | HEIGHT: 69 IN | DIASTOLIC BLOOD PRESSURE: 86 MMHG | WEIGHT: 267 LBS | BODY MASS INDEX: 39.55 KG/M2 | SYSTOLIC BLOOD PRESSURE: 159 MMHG | OXYGEN SATURATION: 97 %

## 2022-03-10 DIAGNOSIS — E66.09 CLASS 2 OBESITY DUE TO EXCESS CALORIES WITHOUT SERIOUS COMORBIDITY WITH BODY MASS INDEX (BMI) OF 36.0 TO 36.9 IN ADULT: ICD-10-CM

## 2022-03-10 DIAGNOSIS — Z99.89 OSA ON CPAP: Primary | ICD-10-CM

## 2022-03-10 DIAGNOSIS — G47.33 OSA ON CPAP: Primary | ICD-10-CM

## 2022-03-10 PROCEDURE — 99213 OFFICE O/P EST LOW 20 MIN: CPT | Performed by: INTERNAL MEDICINE

## 2022-03-10 PROCEDURE — G0463 HOSPITAL OUTPT CLINIC VISIT: HCPCS

## 2022-03-10 NOTE — PROGRESS NOTES
"  [facility]  [address]  [address]  [place] [address]  Phone   Fax       SLEEP CLINIC FOLLOW UP PROGRESS NOTE.    Kings Mera  1965  56 y.o.  male      PCP: Moncho Gonzalez MD      Date of visit: 3/10/2022    Chief Complaint   Patient presents with   • Sleep Apnea   • Obesity       HPI:  This is a 56 y.o. years old patient is here for the management of obstructive sleep apnea.  Sleep apnea is mild in severity with a AHI of 9/hr. Patient is using positive airway pressure therapy with auto CPAP and the symptoms of snoring, non-restorative sleep and daytime excessive sleepiness have improved significantly on the therapy. Normally goes to bed at 10 PM and wakes up at 6:30 AM.  The patient wakes up 3 time(s) during the night and has no problem going back to sleep.  Feels refreshed after waking up.  Patient also denies headaches and nasal congestion.     Medications and allergies are reviewed by me and documented in the encounter.     SOCIAL (habits pertaining to sleep medicine)  History tobacco use:No   History of alcohol use: 12 per week  Caffeine use: 1     REVIEW OF SYSTEMS:   [place] Sleepiness Scale :Total score: 3   Nasal congestion:No   Dry mouth/nose:No   Post nasal drip; No   Acid reflux/Heartburn:No   Abd bloating:No   Morning headache:No   Anxiety:No   Depression:No    PHYSICAL EXAMINATION:  CONSTITUTIONAL:  Vitals:    03/10/22 1103   BP: 159/86   Pulse: 74   SpO2: 97%   Weight: 121 kg (267 lb)   Height: 174 cm (68.5\")    Body mass index is 40.01 kg/m².   NOSE: nasal passages are clear, No deformities noted   RESP SYSTEM: Not in any respiratory distress, no chest deformities noted,   CARDIOVASULAR: No edema noted  NEURO: Oriented x 3, gait normal,  Mood and affect appeared appropriate      Data reviewed:  The Smart card downloaded on 3/10/2022 has been reviewed independently by me for compliance and discussed the data with the patient. "   Compliance; 100%  More than 4 hr use, 100%  Average use of the device 10 hours and 55 per night  Residual AHI: 0.6 /hr (goal < 5.0 /hr)  Mask type: Nasal pillow  Device: ResMed  DME: [facility] oxygen/[employer]      ASSESSMENT AND PLAN:  · Obstructive sleep apnea ( G 47.33).  The symptoms of sleep apnea have improved with the device and the treatment.  Patient's compliance with the device is excellent for treatment of sleep apnea.  I have independently reviewed the smart card down load and discussed with the patient the download data and encouarged the patient to continue to use the device.The residual AHI is acceptable. The device is benefiting the patient and the device is medically necessary.  Without proper control of sleep apnea and good compliance there is a increased risk for hypertension, diabetes mellitus and nonrestorative sleep with hypersomnia which can increase risk for motor vehicle accidents.  Untreated sleep apnea is also a risk factor for development of atrial fibrillation, pulmonary hypertension and stroke. The patient is also instructed to get the supplies from the [facility] and and change them on a regular basis.  A prescription for supplies has been sent to the [facility].  I have also discussed the good sleep hygiene habits and adequate amount of sleep needed for good health.  · Obesity morbid with BMI is Body mass index is 40.01 kg/m².. I have discuss the relationship between the weight and sleep apnea. The benefit of weight loss in reducing severity of sleep apnea was discussed. Discussed diet and exercise with the patient to achieve ideal BMI.   · Return in about 1 year (around 3/10/2023) for Annual visit with smartcard download. . Patient's questions were answered.      Lg Estrada MD  Sleep Medicine.  Medical Director, [facility]  3/10/2022 ,

## 2022-03-21 RX ORDER — OMEPRAZOLE 20 MG/1
CAPSULE, DELAYED RELEASE ORAL
Qty: 30 CAPSULE | Refills: 7 | Status: SHIPPED | OUTPATIENT
Start: 2022-03-21 | End: 2022-11-30

## 2022-05-10 DIAGNOSIS — E78.00 HYPERCHOLESTEROLEMIA: ICD-10-CM

## 2022-05-10 DIAGNOSIS — I10 ESSENTIAL HYPERTENSION: ICD-10-CM

## 2022-05-10 RX ORDER — FENOFIBRATE 145 MG/1
TABLET, COATED ORAL
Qty: 90 TABLET | Refills: 2 | OUTPATIENT
Start: 2022-05-10

## 2022-05-10 RX ORDER — LOSARTAN POTASSIUM 100 MG/1
TABLET ORAL
Qty: 90 TABLET | Refills: 3 | OUTPATIENT
Start: 2022-05-10

## 2022-05-15 DIAGNOSIS — E78.00 HYPERCHOLESTEROLEMIA: ICD-10-CM

## 2022-05-15 DIAGNOSIS — I10 ESSENTIAL HYPERTENSION: ICD-10-CM

## 2022-05-16 DIAGNOSIS — E78.00 HYPERCHOLESTEROLEMIA: ICD-10-CM

## 2022-05-16 DIAGNOSIS — I10 ESSENTIAL HYPERTENSION: ICD-10-CM

## 2022-05-16 RX ORDER — FENOFIBRATE 145 MG/1
TABLET, COATED ORAL
Qty: 90 TABLET | Refills: 2 | OUTPATIENT
Start: 2022-05-16

## 2022-05-16 RX ORDER — LOSARTAN POTASSIUM 100 MG/1
100 TABLET ORAL DAILY
Qty: 30 TABLET | Refills: 0 | Status: SHIPPED | OUTPATIENT
Start: 2022-05-16 | End: 2022-05-20 | Stop reason: SDUPTHER

## 2022-05-16 RX ORDER — FENOFIBRATE 145 MG/1
145 TABLET, COATED ORAL DAILY
Qty: 30 TABLET | Refills: 0 | Status: SHIPPED | OUTPATIENT
Start: 2022-05-16 | End: 2022-05-31 | Stop reason: SDUPTHER

## 2022-05-16 RX ORDER — LOSARTAN POTASSIUM 100 MG/1
TABLET ORAL
Qty: 90 TABLET | Refills: 3 | OUTPATIENT
Start: 2022-05-16

## 2022-05-20 ENCOUNTER — OFFICE VISIT (OUTPATIENT)
Dept: INTERNAL MEDICINE | Facility: CLINIC | Age: 57
End: 2022-05-20

## 2022-05-20 VITALS
HEIGHT: 69 IN | DIASTOLIC BLOOD PRESSURE: 82 MMHG | SYSTOLIC BLOOD PRESSURE: 130 MMHG | BODY MASS INDEX: 38.36 KG/M2 | HEART RATE: 72 BPM | WEIGHT: 259 LBS | TEMPERATURE: 97.8 F

## 2022-05-20 DIAGNOSIS — I10 ESSENTIAL HYPERTENSION: Primary | Chronic | ICD-10-CM

## 2022-05-20 PROCEDURE — 99213 OFFICE O/P EST LOW 20 MIN: CPT | Performed by: INTERNAL MEDICINE

## 2022-05-20 RX ORDER — LOSARTAN POTASSIUM 100 MG/1
100 TABLET ORAL DAILY
Qty: 90 TABLET | Refills: 1 | Status: SHIPPED | OUTPATIENT
Start: 2022-05-20 | End: 2022-12-19

## 2022-05-20 RX ORDER — AMLODIPINE BESYLATE 10 MG/1
10 TABLET ORAL DAILY
Qty: 90 TABLET | Refills: 1 | Status: SHIPPED | OUTPATIENT
Start: 2022-05-20 | End: 2022-11-30

## 2022-05-20 NOTE — PROGRESS NOTES
Subjective        Chief Complaint   Patient presents with   • Hypertension           Kings Mera is a 56 y.o. male who presents for    Patient Active Problem List   Diagnosis   • Essential hypertension   • DIMAS on CPAP   • Hypercholesterolemia   • Numbness and tingling in left hand   • Class 2 obesity due to excess calories without serious comorbidity with body mass index (BMI) of 36.0 to 36.9 in adult   • Pre-diabetes   • Back esophagus       History of Present Illness     His last Bp was 159/78; he has been out of 2 of his BP meds. He has decreased his alcohol. He is down to less than 10 beers per week. He tries to walk 3 miles daily.  No Known Allergies    Current Outpatient Medications on File Prior to Visit   Medication Sig Dispense Refill   • atorvastatin (LIPITOR) 40 MG tablet Take 1 tablet by mouth Daily. 90 tablet 1   • fenofibrate (TRICOR) 145 MG tablet Take 1 tablet by mouth Daily. 30 tablet 0   • Omega-3 Fatty Acids (FISH OIL) 1000 MG capsule capsule Take  by mouth Daily With Breakfast.     • omeprazole (priLOSEC) 20 MG capsule TAKE ONE CAPSULE BY MOUTH DAILY 30 capsule 7   • [DISCONTINUED] amLODIPine (NORVASC) 10 MG tablet TAKE ONE TABLET BY MOUTH DAILY 90 tablet 1   • [DISCONTINUED] losartan (COZAAR) 100 MG tablet Take 1 tablet by mouth Daily. 30 tablet 0     No current facility-administered medications on file prior to visit.       Past Medical History:   Diagnosis Date   • Alopecia areata    • Back esophagus    • COVID-19 01/2022   • Diverticulitis    • Fatty liver    • Gastroesophageal reflux    • Hypercalcemia 7/21/2020   • Hyperplastic colon polyp 2015   • Obesity    • Tinea versicolor    • Tinnitus        Past Surgical History:   Procedure Laterality Date   • COLONOSCOPY  08/28/2015   • UPPER GASTROINTESTINAL ENDOSCOPY  2019       Family History   Problem Relation Age of Onset   • Cancer Mother    • Other Father         head injury   • Colon cancer Neg Hx    • Colon polyps Neg Hx   "      Social History     Socioeconomic History   • Marital status:    Tobacco Use   • Smoking status: Former Smoker     Packs/day: 1.00     Years: 4.00     Pack years: 4.00     Types: Cigarettes     Quit date:      Years since quittin.4   • Smokeless tobacco: Never Used   Substance and Sexual Activity   • Alcohol use: Yes     Comment: 6-8 beers per week   • Drug use: No   • Sexual activity: Defer           The following portions of the patient's history were reviewed and updated as appropriate: problem list, allergies, current medications, past medical history, past family history, past social history and past surgical history.    Review of Systems    Immunization History   Administered Date(s) Administered   • COVID-19 (PFIZER) PURPLE CAP 2021, 2021   • FluLaval/Fluarix/Fluzone >6 2021   • Hepatitis A 11/10/2017, 2019   • Hepatitis B 11/10/2017, 2017, 2019   • Tdap 2010, 2020   • Zostavax 2014       Objective   Vitals:    22 1516   BP: 130/82   Pulse: 72   Temp: 97.8 °F (36.6 °C)   Weight: 117 kg (259 lb)   Height: 174 cm (68.5\")     Body mass index is 38.8 kg/m².  Physical Exam  Vitals reviewed.   Constitutional:       Appearance: He is well-developed.   HENT:      Head: Normocephalic and atraumatic.   Cardiovascular:      Rate and Rhythm: Normal rate and regular rhythm.      Heart sounds: Normal heart sounds, S1 normal and S2 normal.   Pulmonary:      Effort: Pulmonary effort is normal.      Breath sounds: Normal breath sounds.   Skin:     General: Skin is warm.   Neurological:      Mental Status: He is alert.   Psychiatric:         Behavior: Behavior normal.         Procedures    Assessment & Plan   Diagnoses and all orders for this visit:    1. Essential hypertension (Primary)  -     losartan (COZAAR) 100 MG tablet; Take 1 tablet by mouth Daily.  Dispense: 90 tablet; Refill: 1  -     amLODIPine (NORVASC) 10 MG tablet; Take 1 tablet by " mouth Daily.  Dispense: 90 tablet; Refill: 1             RF Norvasc and Losartan; he has been out of for 1 week. I do not think that he needs Toprol at this point. He is doing a good job with losing wt.    Return in about 2 months (around 7/20/2022) for Annual physical.

## 2022-05-31 DIAGNOSIS — E78.00 HYPERCHOLESTEROLEMIA: ICD-10-CM

## 2022-06-01 RX ORDER — FENOFIBRATE 145 MG/1
145 TABLET, COATED ORAL DAILY
Qty: 30 TABLET | Refills: 0 | Status: SHIPPED | OUTPATIENT
Start: 2022-06-01 | End: 2022-06-06 | Stop reason: SDUPTHER

## 2022-06-06 DIAGNOSIS — E78.00 HYPERCHOLESTEROLEMIA: ICD-10-CM

## 2022-06-06 RX ORDER — FENOFIBRATE 145 MG/1
145 TABLET, COATED ORAL DAILY
Qty: 90 TABLET | Refills: 0 | Status: SHIPPED | OUTPATIENT
Start: 2022-06-06 | End: 2022-10-03

## 2022-08-05 ENCOUNTER — OFFICE VISIT (OUTPATIENT)
Dept: INTERNAL MEDICINE | Facility: CLINIC | Age: 57
End: 2022-08-05

## 2022-08-05 VITALS
SYSTOLIC BLOOD PRESSURE: 136 MMHG | BODY MASS INDEX: 38.51 KG/M2 | HEIGHT: 69 IN | TEMPERATURE: 97.8 F | WEIGHT: 260 LBS | DIASTOLIC BLOOD PRESSURE: 80 MMHG | HEART RATE: 72 BPM

## 2022-08-05 DIAGNOSIS — K22.70 BARRETT'S ESOPHAGUS WITHOUT DYSPLASIA: ICD-10-CM

## 2022-08-05 DIAGNOSIS — R73.03 PRE-DIABETES: Chronic | ICD-10-CM

## 2022-08-05 DIAGNOSIS — E66.09 CLASS 2 OBESITY DUE TO EXCESS CALORIES WITHOUT SERIOUS COMORBIDITY WITH BODY MASS INDEX (BMI) OF 36.0 TO 36.9 IN ADULT: ICD-10-CM

## 2022-08-05 DIAGNOSIS — F41.9 ANXIETY: ICD-10-CM

## 2022-08-05 DIAGNOSIS — I10 ESSENTIAL HYPERTENSION: Chronic | ICD-10-CM

## 2022-08-05 DIAGNOSIS — Z00.00 WELLNESS EXAMINATION: Primary | ICD-10-CM

## 2022-08-05 PROCEDURE — 99396 PREV VISIT EST AGE 40-64: CPT | Performed by: INTERNAL MEDICINE

## 2022-08-05 RX ORDER — METOPROLOL SUCCINATE 50 MG/1
50 TABLET, EXTENDED RELEASE ORAL DAILY
Qty: 30 TABLET | Refills: 2 | Status: SHIPPED | OUTPATIENT
Start: 2022-08-05 | End: 2022-09-23

## 2022-08-05 RX ORDER — ESCITALOPRAM OXALATE 5 MG/1
5 TABLET ORAL DAILY
Qty: 30 TABLET | Refills: 2 | Status: SHIPPED | OUTPATIENT
Start: 2022-08-05 | End: 2022-09-23

## 2022-08-05 NOTE — PROGRESS NOTES
Subjective        Chief Complaint   Patient presents with   • Annual Exam           Kings Mera is a 56 y.o. male who presents for    Patient Active Problem List   Diagnosis   • Essential hypertension   • DIMAS on CPAP   • Hypercholesterolemia   • Numbness and tingling in left hand   • Class 2 obesity due to excess calories without serious comorbidity with body mass index (BMI) of 36.0 to 36.9 in adult   • Pre-diabetes   • Back esophagus       History of Present Illness     He has been anxious for a while. He can get fixated on things. He goes back and forth about whether he wants meds. He denies depression. He is using his CPAP. He walks 4 days per week. He has not checked his BP recently.   No Known Allergies    Current Outpatient Medications on File Prior to Visit   Medication Sig Dispense Refill   • amLODIPine (NORVASC) 10 MG tablet Take 1 tablet by mouth Daily. 90 tablet 1   • atorvastatin (LIPITOR) 40 MG tablet Take 1 tablet by mouth Daily. 90 tablet 1   • fenofibrate (TRICOR) 145 MG tablet Take 1 tablet by mouth Daily. 90 tablet 0   • losartan (COZAAR) 100 MG tablet Take 1 tablet by mouth Daily. 90 tablet 1   • Omega-3 Fatty Acids (FISH OIL) 1000 MG capsule capsule Take  by mouth Daily With Breakfast.     • omeprazole (priLOSEC) 20 MG capsule TAKE ONE CAPSULE BY MOUTH DAILY 30 capsule 7     No current facility-administered medications on file prior to visit.       Past Medical History:   Diagnosis Date   • Alopecia areata    • COVID-19 01/2022   • Diverticulitis    • Fatty liver    • Gastroesophageal reflux    • Hypercalcemia 07/21/2020   • Hyperplastic colon polyp 2015   • Tinea versicolor    • Tinnitus        Past Surgical History:   Procedure Laterality Date   • COLONOSCOPY  08/28/2015   • UPPER GASTROINTESTINAL ENDOSCOPY  2019       Family History   Problem Relation Age of Onset   • Cancer Mother    • Other Father         head injury   • Colon cancer Neg Hx    • Colon polyps Neg Hx        Social History  "    Socioeconomic History   • Marital status:    Tobacco Use   • Smoking status: Former Smoker     Packs/day: 1.00     Years: 4.00     Pack years: 4.00     Types: Cigarettes     Quit date:      Years since quittin.6   • Smokeless tobacco: Never Used   Substance and Sexual Activity   • Alcohol use: Yes     Comment: 6-8 beers per week   • Drug use: No   • Sexual activity: Defer           The following portions of the patient's history were reviewed and updated as appropriate: problem list, allergies, current medications, past medical history, past family history, past social history and past surgical history.    Review of Systems   Constitutional: Negative for chills, fever and unexpected weight loss.   HENT: Negative for postnasal drip and sore throat.    Eyes: Negative for blurred vision.   Respiratory: Negative for cough, shortness of breath and wheezing.    Cardiovascular: Negative for chest pain and leg swelling.   Gastrointestinal: Negative for abdominal pain, blood in stool, nausea, vomiting and GERD.   Endocrine: Negative for polyuria.   Genitourinary: Negative for dysuria, frequency and hematuria.   Musculoskeletal: Negative for gait problem.   Skin: Negative for rash.   Allergic/Immunologic: Negative for immunocompromised state.   Neurological: Negative for weakness.   Hematological: Does not bruise/bleed easily.   Psychiatric/Behavioral: Negative for depressed mood. The patient is nervous/anxious.        Immunization History   Administered Date(s) Administered   • COVID-19 (PFIZER) PURPLE CAP 2021, 2021   • FluLaval/Fluarix/Fluzone >6 2021   • Hepatitis A 11/10/2017, 2019   • Hepatitis B 11/10/2017, 2017, 2019   • Tdap 2010, 2020   • Zostavax 2014       Objective   Vitals:    22 1000   BP: 136/80   Pulse: 72   Temp: 97.8 °F (36.6 °C)   Weight: 118 kg (260 lb)   Height: 174 cm (68.5\")     Body mass index is 38.95 kg/m².  Physical " Exam  Vitals reviewed.   Constitutional:       Appearance: He is well-developed.   HENT:      Head: Normocephalic and atraumatic.      Mouth/Throat:      Mouth: Mucous membranes are moist.      Pharynx: Oropharynx is clear.   Eyes:      Extraocular Movements: Extraocular movements intact.      Conjunctiva/sclera: Conjunctivae normal.      Pupils: Pupils are equal, round, and reactive to light.   Neck:      Thyroid: No thyromegaly.      Vascular: No carotid bruit.   Cardiovascular:      Rate and Rhythm: Normal rate and regular rhythm.      Heart sounds: Normal heart sounds. No murmur heard.  Pulmonary:      Effort: Pulmonary effort is normal.      Breath sounds: Normal breath sounds.   Abdominal:      General: There is no distension.      Palpations: Abdomen is soft. There is no mass.      Tenderness: There is no abdominal tenderness. There is no rebound.   Musculoskeletal:      Cervical back: Neck supple.   Lymphadenopathy:      Cervical: No cervical adenopathy.   Skin:     General: Skin is warm.   Neurological:      Mental Status: He is alert.   Psychiatric:         Behavior: Behavior normal.         Procedures    Assessment & Plan   Diagnoses and all orders for this visit:    1. Wellness examination (Primary)    2. Anxiety  -     escitalopram (Lexapro) 5 MG tablet; Take 1 tablet by mouth Daily.  Dispense: 30 tablet; Refill: 2    3. Back's esophagus without dysplasia  -     Ambulatory referral for Screening EGD    4. Pre-diabetes  -     Hemoglobin A1c    5. Class 2 obesity due to excess calories without serious comorbidity with body mass index (BMI) of 36.0 to 36.9 in adult    6. Essential hypertension  -     metoprolol succinate XL (Toprol XL) 50 MG 24 hr tablet; Take 1 tablet by mouth Daily.  Dispense: 30 tablet; Refill: 2               Add Toprol for HTN. Start smallest dose of Lexapro for anxiety; reviewed side effects. Discussed exercising 150 minutes per week and Shingrix. Set up EGD. I have message to   Keiry to see if he obtained his prior cscope path from 2015.  Return in about 6 weeks (around 9/16/2022) for Lab Today.

## 2022-08-06 LAB — HBA1C MFR BLD: 5.9 % (ref 4.8–5.6)

## 2022-08-19 ENCOUNTER — TELEPHONE (OUTPATIENT)
Dept: INTERNAL MEDICINE | Facility: CLINIC | Age: 57
End: 2022-08-19

## 2022-08-19 NOTE — TELEPHONE ENCOUNTER
Let him know that I have been in contact with Dr. Ricci and based upon his path from 2015 that his next colonoscopy is not due until 2025

## 2022-09-23 ENCOUNTER — OFFICE VISIT (OUTPATIENT)
Dept: INTERNAL MEDICINE | Facility: CLINIC | Age: 57
End: 2022-09-23

## 2022-09-23 VITALS
BODY MASS INDEX: 38.8 KG/M2 | HEIGHT: 69 IN | WEIGHT: 262 LBS | HEART RATE: 60 BPM | DIASTOLIC BLOOD PRESSURE: 70 MMHG | TEMPERATURE: 98.1 F | SYSTOLIC BLOOD PRESSURE: 100 MMHG

## 2022-09-23 DIAGNOSIS — I10 ESSENTIAL HYPERTENSION: Chronic | ICD-10-CM

## 2022-09-23 DIAGNOSIS — F41.9 ANXIETY: ICD-10-CM

## 2022-09-23 DIAGNOSIS — K22.70 BARRETT'S ESOPHAGUS WITHOUT DYSPLASIA: Primary | ICD-10-CM

## 2022-09-23 PROCEDURE — 99214 OFFICE O/P EST MOD 30 MIN: CPT | Performed by: INTERNAL MEDICINE

## 2022-09-23 RX ORDER — ESCITALOPRAM OXALATE 10 MG/1
10 TABLET ORAL DAILY
Qty: 30 TABLET | Refills: 2 | Status: SHIPPED | OUTPATIENT
Start: 2022-09-23 | End: 2022-12-04 | Stop reason: SDUPTHER

## 2022-09-23 RX ORDER — METOPROLOL SUCCINATE 25 MG/1
25 TABLET, EXTENDED RELEASE ORAL DAILY
Qty: 30 TABLET | Refills: 2 | Status: SHIPPED | OUTPATIENT
Start: 2022-09-23 | End: 2022-12-04 | Stop reason: SDUPTHER

## 2022-09-23 NOTE — PROGRESS NOTES
Subjective        Chief Complaint   Patient presents with   • Hypertension   • Anxiety           Kings Mera is a 56 y.o. male who presents for    Patient Active Problem List   Diagnosis   • Essential hypertension   • DIMAS on CPAP   • Hypercholesterolemia   • Numbness and tingling in left hand   • Class 2 obesity due to excess calories without serious comorbidity with body mass index (BMI) of 36.0 to 36.9 in adult   • Pre-diabetes   • Back esophagus   • Anxiety       History of Present Illness     His BP was 172/72 at the dentist yesterday. It was 143/62 three weeks ago. He is a little less anxious but he says he has room for improvement. He is stressed at work. He is a year away from snf. He denies SI.   No Known Allergies    Current Outpatient Medications on File Prior to Visit   Medication Sig Dispense Refill   • amLODIPine (NORVASC) 10 MG tablet Take 1 tablet by mouth Daily. 90 tablet 1   • atorvastatin (LIPITOR) 40 MG tablet Take 1 tablet by mouth Daily. 90 tablet 1   • fenofibrate (TRICOR) 145 MG tablet Take 1 tablet by mouth Daily. 90 tablet 0   • losartan (COZAAR) 100 MG tablet Take 1 tablet by mouth Daily. 90 tablet 1   • Omega-3 Fatty Acids (FISH OIL) 1000 MG capsule capsule Take  by mouth Daily With Breakfast.     • omeprazole (priLOSEC) 20 MG capsule TAKE ONE CAPSULE BY MOUTH DAILY 30 capsule 7   • [DISCONTINUED] escitalopram (Lexapro) 5 MG tablet Take 1 tablet by mouth Daily. 30 tablet 2   • [DISCONTINUED] metoprolol succinate XL (Toprol XL) 50 MG 24 hr tablet Take 1 tablet by mouth Daily. 30 tablet 2     No current facility-administered medications on file prior to visit.       Past Medical History:   Diagnosis Date   • Alopecia areata    • COVID-19 01/2022   • Diverticulitis    • Fatty liver    • Gastroesophageal reflux    • Hypercalcemia 07/21/2020   • Hyperplastic colon polyp 2015   • Tinea versicolor    • Tinnitus        Past Surgical History:   Procedure Laterality Date   • COLONOSCOPY   "2015   • UPPER GASTROINTESTINAL ENDOSCOPY  2019       Family History   Problem Relation Age of Onset   • Cancer Mother    • Other Father         head injury   • Colon cancer Neg Hx    • Colon polyps Neg Hx        Social History     Socioeconomic History   • Marital status:    Tobacco Use   • Smoking status: Former Smoker     Packs/day: 1.00     Years: 4.00     Pack years: 4.00     Types: Cigarettes     Quit date:      Years since quittin.7   • Smokeless tobacco: Never Used   Substance and Sexual Activity   • Alcohol use: Yes     Comment: 6-8 beers per week   • Drug use: No   • Sexual activity: Defer           The following portions of the patient's history were reviewed and updated as appropriate: problem list, allergies, current medications, past medical history, past family history, past social history and past surgical history.    Review of Systems    Immunization History   Administered Date(s) Administered   • COVID-19 (PFIZER) PURPLE CAP 2021, 2021   • FluLaval/Fluzone >6mos 2021   • Hepatitis A 11/10/2017, 2019   • Hepatitis B 11/10/2017, 2017, 2019   • Tdap 2010, 2020   • Zostavax 2014       Objective   Vitals:    22 0926   BP: 100/70   Pulse: 60   Temp: 98.1 °F (36.7 °C)   Weight: 119 kg (262 lb)   Height: 174 cm (68.5\")     Body mass index is 39.25 kg/m².  Physical Exam  Vitals reviewed.   Constitutional:       Appearance: He is well-developed.   HENT:      Head: Normocephalic and atraumatic.   Cardiovascular:      Rate and Rhythm: Normal rate and regular rhythm.      Heart sounds: Normal heart sounds, S1 normal and S2 normal.   Pulmonary:      Effort: Pulmonary effort is normal.      Breath sounds: Normal breath sounds.   Skin:     General: Skin is warm.   Neurological:      Mental Status: He is alert.   Psychiatric:         Behavior: Behavior normal.         Procedures    Assessment & Plan   Diagnoses and all orders for this " visit:    1. Back's esophagus without dysplasia (Primary)    2. Anxiety  -     escitalopram (Lexapro) 10 MG tablet; Take 1 tablet by mouth Daily.  Dispense: 30 tablet; Refill: 2    3. Essential hypertension  -     metoprolol succinate XL (Toprol XL) 25 MG 24 hr tablet; Take 1 tablet by mouth Daily.  Dispense: 30 tablet; Refill: 2             He will r/s OV with Dr. Ricci for his esophagus. Not due for cscope until 2025. Increase Lexapro. Decrease Toprol. Declines flu shot.    Return in about 6 weeks (around 11/4/2022).

## 2022-09-27 ENCOUNTER — TELEPHONE (OUTPATIENT)
Dept: GASTROENTEROLOGY | Facility: CLINIC | Age: 57
End: 2022-09-27

## 2022-10-02 DIAGNOSIS — E78.00 HYPERCHOLESTEROLEMIA: ICD-10-CM

## 2022-10-03 RX ORDER — FENOFIBRATE 145 MG/1
TABLET, COATED ORAL
Qty: 30 TABLET | Refills: 1 | Status: SHIPPED | OUTPATIENT
Start: 2022-10-03 | End: 2022-12-05

## 2022-11-17 DIAGNOSIS — I10 ESSENTIAL HYPERTENSION: Chronic | ICD-10-CM

## 2022-11-17 RX ORDER — AMLODIPINE BESYLATE 10 MG/1
TABLET ORAL
Qty: 90 TABLET | Refills: 1 | OUTPATIENT
Start: 2022-11-17

## 2022-11-21 RX ORDER — OMEPRAZOLE 20 MG/1
CAPSULE, DELAYED RELEASE ORAL
Qty: 30 CAPSULE | Refills: 7 | OUTPATIENT
Start: 2022-11-21

## 2022-11-30 DIAGNOSIS — I10 ESSENTIAL HYPERTENSION: Chronic | ICD-10-CM

## 2022-11-30 RX ORDER — AMLODIPINE BESYLATE 10 MG/1
TABLET ORAL
Qty: 90 TABLET | Refills: 1 | Status: SHIPPED | OUTPATIENT
Start: 2022-11-30 | End: 2023-03-17 | Stop reason: SDUPTHER

## 2022-11-30 RX ORDER — OMEPRAZOLE 20 MG/1
CAPSULE, DELAYED RELEASE ORAL
Qty: 30 CAPSULE | Refills: 7 | Status: SHIPPED | OUTPATIENT
Start: 2022-11-30 | End: 2023-03-17 | Stop reason: SDUPTHER

## 2022-12-03 DIAGNOSIS — E78.00 HYPERCHOLESTEROLEMIA: ICD-10-CM

## 2022-12-04 DIAGNOSIS — F41.9 ANXIETY: ICD-10-CM

## 2022-12-04 DIAGNOSIS — I10 ESSENTIAL HYPERTENSION: Chronic | ICD-10-CM

## 2022-12-05 RX ORDER — METOPROLOL SUCCINATE 25 MG/1
25 TABLET, EXTENDED RELEASE ORAL DAILY
Qty: 30 TABLET | Refills: 2 | Status: SHIPPED | OUTPATIENT
Start: 2022-12-05 | End: 2023-03-02

## 2022-12-05 RX ORDER — ESCITALOPRAM OXALATE 10 MG/1
10 TABLET ORAL DAILY
Qty: 30 TABLET | Refills: 2 | Status: SHIPPED | OUTPATIENT
Start: 2022-12-05 | End: 2022-12-29

## 2022-12-05 RX ORDER — FENOFIBRATE 145 MG/1
TABLET, COATED ORAL
Qty: 30 TABLET | Refills: 1 | Status: SHIPPED | OUTPATIENT
Start: 2022-12-05 | End: 2022-12-07 | Stop reason: SDUPTHER

## 2022-12-07 ENCOUNTER — PREP FOR SURGERY (OUTPATIENT)
Dept: SURGERY | Facility: SURGERY CENTER | Age: 57
End: 2022-12-07

## 2022-12-07 ENCOUNTER — OFFICE VISIT (OUTPATIENT)
Dept: GASTROENTEROLOGY | Facility: CLINIC | Age: 57
End: 2022-12-07

## 2022-12-07 VITALS
TEMPERATURE: 98 F | OXYGEN SATURATION: 97 % | HEIGHT: 69 IN | BODY MASS INDEX: 40.7 KG/M2 | HEART RATE: 66 BPM | SYSTOLIC BLOOD PRESSURE: 122 MMHG | WEIGHT: 274.8 LBS | DIASTOLIC BLOOD PRESSURE: 76 MMHG

## 2022-12-07 DIAGNOSIS — Z12.11 COLON CANCER SCREENING: ICD-10-CM

## 2022-12-07 DIAGNOSIS — E78.00 HYPERCHOLESTEROLEMIA: ICD-10-CM

## 2022-12-07 DIAGNOSIS — K22.70 BARRETT'S ESOPHAGUS WITHOUT DYSPLASIA: Primary | Chronic | ICD-10-CM

## 2022-12-07 DIAGNOSIS — K44.9 HIATAL HERNIA: ICD-10-CM

## 2022-12-07 DIAGNOSIS — K44.9 HIATAL HERNIA: Chronic | ICD-10-CM

## 2022-12-07 DIAGNOSIS — K21.9 GASTROESOPHAGEAL REFLUX DISEASE, UNSPECIFIED WHETHER ESOPHAGITIS PRESENT: Chronic | ICD-10-CM

## 2022-12-07 DIAGNOSIS — K22.70 BARRETT'S ESOPHAGUS WITHOUT DYSPLASIA: Primary | ICD-10-CM

## 2022-12-07 DIAGNOSIS — K21.9 GASTROESOPHAGEAL REFLUX DISEASE, UNSPECIFIED WHETHER ESOPHAGITIS PRESENT: ICD-10-CM

## 2022-12-07 DIAGNOSIS — Z86.010 HX OF COLONIC POLYPS: ICD-10-CM

## 2022-12-07 PROCEDURE — 99214 OFFICE O/P EST MOD 30 MIN: CPT | Performed by: NURSE PRACTITIONER

## 2022-12-07 RX ORDER — SODIUM CHLORIDE 0.9 % (FLUSH) 0.9 %
10 SYRINGE (ML) INJECTION AS NEEDED
Status: CANCELLED | OUTPATIENT
Start: 2022-12-07

## 2022-12-07 RX ORDER — FENOFIBRATE 145 MG/1
145 TABLET, COATED ORAL DAILY
Qty: 30 TABLET | Refills: 1 | Status: SHIPPED | OUTPATIENT
Start: 2022-12-07 | End: 2023-01-25 | Stop reason: SDUPTHER

## 2022-12-07 RX ORDER — SODIUM CHLORIDE 0.9 % (FLUSH) 0.9 %
3 SYRINGE (ML) INJECTION EVERY 12 HOURS SCHEDULED
Status: CANCELLED | OUTPATIENT
Start: 2022-12-07

## 2022-12-07 RX ORDER — SODIUM CHLORIDE, SODIUM LACTATE, POTASSIUM CHLORIDE, CALCIUM CHLORIDE 600; 310; 30; 20 MG/100ML; MG/100ML; MG/100ML; MG/100ML
30 INJECTION, SOLUTION INTRAVENOUS CONTINUOUS PRN
Status: CANCELLED | OUTPATIENT
Start: 2022-12-07

## 2022-12-07 NOTE — PROGRESS NOTES
"Chief Complaint   Patient presents with   • Back's esophagus, GERD, hiatal hernia, colon polyps, colo         History of Present Illness  56-year-old male presents the office today for follow-up.  He reports having a history of GERD and Back's esophagus.  He takes omeprazole 20 mg once daily.  Last EGD was performed on 4/4/2019 revealing a 1 cm hiatal hernia and Back's esophagus.  Patient is past due for his surveillance EGD for Back's esophagus.  He reports good control of heartburn and reflux with omeprazole 20 mg once daily.  He denies any nausea, vomiting, or dysphagia.    Last colonoscopy was performed on 8/28/2015 by Dr. Lisa Middleton revealing a colon polyp.  Patient was recommended undergo surveillance colonoscopy at a 5-year interval.  He denies any change in bowel habits, rectal bleeding, or blood in the stool.  He denies any family history of colon cancer.    Review of Systems   Constitutional: Negative for fever and unexpected weight change.   HENT: Negative for trouble swallowing.    Cardiovascular: Negative for chest pain.   Gastrointestinal: Negative for abdominal distention, abdominal pain, anal bleeding, blood in stool, constipation, diarrhea, nausea, rectal pain and vomiting.      Result Review :       SCANNED - COLONOSCOPY (08/28/2015)  ENDOSCOPY, INT (04/04/2019)  SCANNED PATHOLOGY (04/04/2019)  Office Visit with Sebastian Ricci MD (06/24/2021)    Vital Signs:   /76   Pulse 66   Temp 98 °F (36.7 °C)   Ht 174 cm (68.5\")   Wt 125 kg (274 lb 12.8 oz)   SpO2 97%   BMI 41.18 kg/m²     Body mass index is 41.18 kg/m².     Physical Exam  Vitals reviewed.   Constitutional:       Appearance: Normal appearance.   HENT:      Head: Normocephalic.      Nose: Nose normal.      Mouth/Throat:      Mouth: Mucous membranes are moist.   Eyes:      General: No scleral icterus.     Extraocular Movements: Extraocular movements intact.   Cardiovascular:      Rate and Rhythm: Normal rate and " regular rhythm.      Pulses: Normal pulses.      Heart sounds: Normal heart sounds.   Pulmonary:      Effort: Pulmonary effort is normal. No respiratory distress.      Breath sounds: Normal breath sounds.   Abdominal:      General: Abdomen is flat. Bowel sounds are normal. There is no distension.      Palpations: Abdomen is soft. There is no mass.      Tenderness: There is no abdominal tenderness. There is no guarding.   Musculoskeletal:         General: Normal range of motion.      Cervical back: Normal range of motion and neck supple.   Skin:     General: Skin is warm and dry.   Neurological:      General: No focal deficit present.      Mental Status: He is alert and oriented to person, place, and time.   Psychiatric:         Mood and Affect: Mood normal.         Behavior: Behavior normal.         Thought Content: Thought content normal.         Judgment: Judgment normal.       Assessment and Plan    Diagnoses and all orders for this visit:    1. Back's esophagus without dysplasia (Primary)    2. Gastroesophageal reflux disease, unspecified whether esophagitis present    3. Hiatal hernia  Comments:  1 cm    4. Hx of colonic polyps    5. Colon cancer screening           Patient Instructions   1. Continue omeprazole 20 mg once daily for GERD and Back's esophagus.    2.   For surveillance of Back's esophagus we will schedule an EGD.    3.   For colon cancer screening due to your history of colon polyps we will schedule a colonoscopy.      Discussion:    Patient to continue omeprazole 20 mg once daily for management of GERD, Back's esophagus, and hiatal hernia.  He is slightly past due for his surveillance EGD for Back's esophagus, which we will schedule.    Due to his history of colon polyps and for colon cancer screening we will also schedule a colonoscopy.  Additional recommendations pending outcome of procedures.  Patient verbalized understanding of above plan of care and is in agreement.  All  questions answered and support provided.     EMR Dragon/Transcription Disclaimer:  This document has been Dictated utilizing Dragon dictation.

## 2022-12-07 NOTE — PATIENT INSTRUCTIONS
Continue omeprazole 20 mg once daily for GERD and Back's esophagus.    2.   For surveillance of Back's esophagus we will schedule an EGD.    3.   For colon cancer screening due to your history of colon polyps we will schedule a colonoscopy.

## 2022-12-18 DIAGNOSIS — I10 ESSENTIAL HYPERTENSION: Chronic | ICD-10-CM

## 2022-12-19 RX ORDER — LOSARTAN POTASSIUM 100 MG/1
TABLET ORAL
Qty: 30 TABLET | Refills: 1 | Status: SHIPPED | OUTPATIENT
Start: 2022-12-19 | End: 2023-02-23

## 2022-12-29 ENCOUNTER — OFFICE VISIT (OUTPATIENT)
Dept: INTERNAL MEDICINE | Facility: CLINIC | Age: 57
End: 2022-12-29

## 2022-12-29 VITALS
BODY MASS INDEX: 40.43 KG/M2 | WEIGHT: 273 LBS | HEIGHT: 69 IN | SYSTOLIC BLOOD PRESSURE: 132 MMHG | DIASTOLIC BLOOD PRESSURE: 76 MMHG

## 2022-12-29 DIAGNOSIS — I10 ESSENTIAL HYPERTENSION: Primary | Chronic | ICD-10-CM

## 2022-12-29 DIAGNOSIS — R73.03 PRE-DIABETES: Chronic | ICD-10-CM

## 2022-12-29 DIAGNOSIS — F41.9 ANXIETY: Chronic | ICD-10-CM

## 2022-12-29 DIAGNOSIS — Z12.5 PROSTATE CANCER SCREENING: ICD-10-CM

## 2022-12-29 PROCEDURE — 99214 OFFICE O/P EST MOD 30 MIN: CPT | Performed by: INTERNAL MEDICINE

## 2022-12-29 RX ORDER — ESCITALOPRAM OXALATE 20 MG/1
20 TABLET ORAL DAILY
Qty: 30 TABLET | Refills: 4 | Status: SHIPPED | OUTPATIENT
Start: 2022-12-29 | End: 2023-03-17 | Stop reason: SDUPTHER

## 2022-12-29 NOTE — PROGRESS NOTES
Subjective        Chief Complaint   Patient presents with   • Hypertension           Kings Mera is a 57 y.o. male who presents for    Patient Active Problem List   Diagnosis   • Essential hypertension   • DIMAS on CPAP   • Hypercholesterolemia   • Numbness and tingling in left hand   • Class 2 obesity due to excess calories without serious comorbidity with body mass index (BMI) of 36.0 to 36.9 in adult   • Pre-diabetes   • Back esophagus   • Anxiety       History of Present Illness     Her BP has been 140/60. He denies chest pain or dyspnea. He has stress with work. He has some anxiety. He thinks we may need to increase Lexapro more. He denies SI. He saw GI and he is to have an EGD and cscope in January. He drinks 6-10 beers on the weekend.  No Known Allergies    Current Outpatient Medications on File Prior to Visit   Medication Sig Dispense Refill   • amLODIPine (NORVASC) 10 MG tablet TAKE ONE TABLET BY MOUTH DAILY 90 tablet 1   • atorvastatin (LIPITOR) 40 MG tablet Take 1 tablet by mouth Daily. 90 tablet 1   • fenofibrate (TRICOR) 145 MG tablet Take 1 tablet by mouth Daily. 30 tablet 1   • losartan (COZAAR) 100 MG tablet TAKE ONE TABLET BY MOUTH DAILY 30 tablet 1   • metoprolol succinate XL (Toprol XL) 25 MG 24 hr tablet Take 1 tablet by mouth Daily. 30 tablet 2   • Omega-3 Fatty Acids (FISH OIL) 1000 MG capsule capsule Take  by mouth Daily With Breakfast.     • omeprazole (priLOSEC) 20 MG capsule TAKE ONE CAPSULE BY MOUTH DAILY 30 capsule 7   • [DISCONTINUED] escitalopram (Lexapro) 10 MG tablet Take 1 tablet by mouth Daily. 30 tablet 2     No current facility-administered medications on file prior to visit.       Past Medical History:   Diagnosis Date   • Alopecia areata    • COVID-19 01/2022   • Diverticulitis    • Fatty liver    • Gastroesophageal reflux    • Hypercalcemia 07/21/2020   • Hyperplastic colon polyp 2015   • Tinea versicolor    • Tinnitus        Past Surgical History:   Procedure Laterality Date  "  • COLONOSCOPY  2015   • UPPER GASTROINTESTINAL ENDOSCOPY  2019       Family History   Problem Relation Age of Onset   • Cancer Mother    • Other Father         head injury   • Colon cancer Neg Hx    • Colon polyps Neg Hx        Social History     Socioeconomic History   • Marital status:    Tobacco Use   • Smoking status: Former     Packs/day: 1.00     Years: 4.00     Pack years: 4.00     Types: Cigarettes     Quit date:      Years since quittin.0   • Smokeless tobacco: Never   Vaping Use   • Vaping Use: Never used   Substance and Sexual Activity   • Alcohol use: Yes     Comment: 6-8 beers per week   • Drug use: No   • Sexual activity: Defer           The following portions of the patient's history were reviewed and updated as appropriate: problem list, allergies, current medications, past medical history, past family history, past social history and past surgical history.    Review of Systems    Immunization History   Administered Date(s) Administered   • COVID-19 (PFIZER) PURPLE CAP 2021, 2021   • FluLaval/Fluzone >6mos 2021   • Hepatitis A 11/10/2017, 2019   • Hepatitis B 11/10/2017, 2017, 2019   • Tdap 2010, 2020   • Zostavax 2014       Objective   Vitals:    22 1030   BP: 132/76   Weight: 124 kg (273 lb)   Height: 174 cm (68.5\")     Body mass index is 40.91 kg/m².  Physical Exam  Vitals reviewed.   Constitutional:       Appearance: He is well-developed.   HENT:      Head: Normocephalic and atraumatic.   Cardiovascular:      Rate and Rhythm: Normal rate and regular rhythm.      Heart sounds: Normal heart sounds, S1 normal and S2 normal.   Pulmonary:      Effort: Pulmonary effort is normal.      Breath sounds: Normal breath sounds.   Skin:     General: Skin is warm.   Neurological:      Mental Status: He is alert.   Psychiatric:         Behavior: Behavior normal.         Procedures    Assessment & Plan   Diagnoses and all orders " for this visit:    1. Essential hypertension (Primary)  -     Comprehensive Metabolic Panel; Future  -     Lipid Panel With / Chol / HDL Ratio; Future    2. Pre-diabetes  -     Hemoglobin A1c; Future    3. Anxiety  -     escitalopram (Lexapro) 20 MG tablet; Take 1 tablet by mouth Daily.  Dispense: 30 tablet; Refill: 4    4. Prostate cancer screening  -     PSA Screen; Future             Increase Lexapro. Decrease beer some more and increase walking. SBP on upper limit nl. To have an EGD and csope in January. Declines flu shot.    Return in about 2 months (around 2/28/2023) for Lab Before FUP.

## 2023-01-13 ENCOUNTER — OUTSIDE FACILITY SERVICE (OUTPATIENT)
Dept: GASTROENTEROLOGY | Facility: CLINIC | Age: 58
End: 2023-01-13
Payer: COMMERCIAL

## 2023-01-13 ENCOUNTER — LAB REQUISITION (OUTPATIENT)
Dept: LAB | Facility: HOSPITAL | Age: 58
End: 2023-01-13
Payer: COMMERCIAL

## 2023-01-13 DIAGNOSIS — K22.70 BARRETT'S ESOPHAGUS WITHOUT DYSPLASIA: ICD-10-CM

## 2023-01-13 PROCEDURE — 45385 COLONOSCOPY W/LESION REMOVAL: CPT | Performed by: INTERNAL MEDICINE

## 2023-01-13 PROCEDURE — 45380 COLONOSCOPY AND BIOPSY: CPT | Performed by: INTERNAL MEDICINE

## 2023-01-13 PROCEDURE — 43239 EGD BIOPSY SINGLE/MULTIPLE: CPT | Performed by: INTERNAL MEDICINE

## 2023-01-13 PROCEDURE — 88305 TISSUE EXAM BY PATHOLOGIST: CPT | Performed by: INTERNAL MEDICINE

## 2023-01-13 PROCEDURE — 88313 SPECIAL STAINS GROUP 2: CPT | Performed by: INTERNAL MEDICINE

## 2023-01-16 LAB
LAB AP CASE REPORT: NORMAL
LAB AP DIAGNOSIS COMMENT: NORMAL
PATH REPORT.FINAL DX SPEC: NORMAL
PATH REPORT.GROSS SPEC: NORMAL

## 2023-01-17 DIAGNOSIS — E78.00 HYPERCHOLESTEROLEMIA: ICD-10-CM

## 2023-01-17 RX ORDER — FENOFIBRATE 145 MG/1
145 TABLET, COATED ORAL DAILY
Qty: 30 TABLET | Refills: 1 | OUTPATIENT
Start: 2023-01-17

## 2023-01-17 NOTE — TELEPHONE ENCOUNTER
Needs to schedule March visit  
Sent patient note to schedule appointment  
Alert-The patient is alert, awake and responds to voice. The patient is oriented to time, place, and person. The triage nurse is able to obtain subjective information.

## 2023-01-25 DIAGNOSIS — E78.00 HYPERCHOLESTEROLEMIA: ICD-10-CM

## 2023-01-26 ENCOUNTER — TELEPHONE (OUTPATIENT)
Dept: INTERNAL MEDICINE | Facility: CLINIC | Age: 58
End: 2023-01-26
Payer: COMMERCIAL

## 2023-01-26 RX ORDER — FENOFIBRATE 145 MG/1
145 TABLET, COATED ORAL DAILY
Qty: 30 TABLET | Refills: 1 | Status: SHIPPED | OUTPATIENT
Start: 2023-01-26 | End: 2023-02-02 | Stop reason: SDUPTHER

## 2023-02-02 DIAGNOSIS — E78.00 HYPERCHOLESTEROLEMIA: ICD-10-CM

## 2023-02-02 RX ORDER — FENOFIBRATE 145 MG/1
145 TABLET, COATED ORAL DAILY
Qty: 30 TABLET | Refills: 1 | Status: SHIPPED | OUTPATIENT
Start: 2023-02-02 | End: 2023-03-17 | Stop reason: SDUPTHER

## 2023-02-02 RX ORDER — FENOFIBRATE 145 MG/1
TABLET, COATED ORAL
Qty: 30 TABLET | Refills: 1 | OUTPATIENT
Start: 2023-02-02

## 2023-02-02 NOTE — TELEPHONE ENCOUNTER
Called patient and LVM to give us a call  Referral Type: INTERNAL  Location: [facility]  Procedure: Colonoscopy  Diagnosis: screen for colon cancer  Referring Provider: Dr. Orellana  Referral To: UNSPECIFIED  Referral Notes: none   Previous Procedure: NO - none seen on review.    COVID Vaccine Status: Patient is not vaccinated.     Attempted to reach by phone. VM full and unable to leave message.  Mailed referral reminder letter.

## 2023-02-23 DIAGNOSIS — I10 ESSENTIAL HYPERTENSION: Chronic | ICD-10-CM

## 2023-02-23 RX ORDER — LOSARTAN POTASSIUM 100 MG/1
TABLET ORAL
Qty: 30 TABLET | Refills: 1 | Status: SHIPPED | OUTPATIENT
Start: 2023-02-23 | End: 2023-03-17 | Stop reason: SDUPTHER

## 2023-02-24 DIAGNOSIS — E78.00 HYPERCHOLESTEROLEMIA: ICD-10-CM

## 2023-02-24 RX ORDER — ATORVASTATIN CALCIUM 40 MG/1
TABLET, FILM COATED ORAL
Qty: 90 TABLET | Refills: 1 | Status: SHIPPED | OUTPATIENT
Start: 2023-02-24 | End: 2023-03-17 | Stop reason: SDUPTHER

## 2023-03-02 DIAGNOSIS — I10 ESSENTIAL HYPERTENSION: Chronic | ICD-10-CM

## 2023-03-02 RX ORDER — METOPROLOL SUCCINATE 25 MG/1
TABLET, EXTENDED RELEASE ORAL
Qty: 30 TABLET | Refills: 2 | Status: SHIPPED | OUTPATIENT
Start: 2023-03-02 | End: 2023-03-17 | Stop reason: SDUPTHER

## 2023-03-09 ENCOUNTER — OFFICE VISIT (OUTPATIENT)
Dept: SLEEP MEDICINE | Facility: HOSPITAL | Age: 58
End: 2023-03-09
Payer: COMMERCIAL

## 2023-03-09 VITALS
OXYGEN SATURATION: 96 % | HEART RATE: 62 BPM | SYSTOLIC BLOOD PRESSURE: 122 MMHG | BODY MASS INDEX: 38.98 KG/M2 | DIASTOLIC BLOOD PRESSURE: 72 MMHG | HEIGHT: 69 IN | WEIGHT: 263.2 LBS

## 2023-03-09 DIAGNOSIS — Z99.89 OSA ON CPAP: Primary | ICD-10-CM

## 2023-03-09 DIAGNOSIS — E66.09 CLASS 2 OBESITY DUE TO EXCESS CALORIES WITHOUT SERIOUS COMORBIDITY WITH BODY MASS INDEX (BMI) OF 36.0 TO 36.9 IN ADULT: ICD-10-CM

## 2023-03-09 DIAGNOSIS — G47.33 OSA ON CPAP: Primary | ICD-10-CM

## 2023-03-09 PROCEDURE — G0463 HOSPITAL OUTPT CLINIC VISIT: HCPCS

## 2023-03-09 PROCEDURE — 99213 OFFICE O/P EST LOW 20 MIN: CPT | Performed by: INTERNAL MEDICINE

## 2023-03-09 NOTE — PROGRESS NOTES
"  [facility]  [address]  [address]  [place] [address]  Phone   Fax       SLEEP CLINIC FOLLOW UP PROGRESS NOTE.    Kings Mera  [id number]   1965  57 y.o.  male      PCP: Moncho Gonzalez MD      Date of visit: 3/9/2023    Chief Complaint   Patient presents with   • Sleep Apnea   • Obesity       HPI:  This is a 57 y.o. years old patient is here for the management of obstructive sleep apnea.  Sleep apnea is mild in severity with a AHI of 8.6/hr. Patient is using positive airway pressure therapy with APAP and the symptoms of sleep apnea have improved significantly on the therapy. Normally patient goes to bed at 10 PM and wakes up at 6 AM .  The patient wakes up 2-3 time(s) during the night and has no problem going back to sleep.  Feels refreshed after waking up.  He works for [employer] and has to travel quite a bit    Medications and allergies are reviewed by me and documented in the encounter.     SOCIAL (habits pertaining to sleep medicine)  • History tobacco use:No   • History of alcohol use: 0 per week  • Caffeine use: 3     REVIEW OF SYSTEMS:   Pertaining positive symptoms are:  • [place] Sleepiness Scale :Total score: 0       PHYSICAL EXAMINATION:  CONSTITUTIONAL:  Vitals:    03/09/23 0900   BP: 122/72   Pulse: 62   SpO2: 96%   Weight: 119 kg (263 lb 3.2 oz)   Height: 174 cm (68.5\")    Body mass index is 39.44 kg/m².   NOSE: nasal passages are clear, No deformities noted   RESP SYSTEM: Not in any respiratory distress, no chest deformities noted,   CARDIOVASULAR: No edema noted  NEURO: Oriented x 3, gait normal,  Mood and affect appeared appropriate      Data reviewed:  The Smart card downloaded on 3/9/2023 has been reviewed independently by me for compliance and discussed the data with the patient.   Compliance; 100%  More than 4 hr use, 100%  Average use of the device 10 hours per night  Residual AHI: 1.3 /hr (goal < 5.0 /hr)  Mask type: Nasal " pillows  Device: ResMed  DME: [employer]      ASSESSMENT AND PLAN:  · Obstructive sleep apnea ( G 47.33).  The symptoms of sleep apnea have improved with the device and the treatment.  Patient's compliance with the device is excellent for treatment of sleep apnea.  I have independently reviewed the smart card down load and discussed with the patient the download data and encouarged the patient to continue to use the device.The residual AHI is acceptable. The device is benefiting the patient and the device is medically necessary.  Without proper control of sleep apnea and good compliance there is a increased risk for hypertension, diabetes mellitus and nonrestorative sleep with hypersomnia which can increase risk for motor vehicle accidents.  Untreated sleep apnea is also a risk factor for development of atrial fibrillation, pulmonary hypertension, insulin resistance and stroke. The patient is also instructed to get the supplies from the DME company and and change them on a regular basis.  A prescription for supplies has been sent to the DME company.  I have also discussed the good sleep hygiene habits and adequate amount of sleep needed for good health.  · Obesity  2 with BMI is Body mass index is 39.44 kg/m².. I have discuss the relationship between the weight and sleep apnea. The benefit of weight loss in reducing severity of sleep apnea was discussed. Discussed diet and exercise with the patient to achieve ideal BMI.   · Return in about 1 year (around 3/9/2024) for with smart card down load. . Patient's questions were answered.    3/9/2023  Lg Estrada MD  Sleep Medicine.  Medical Director,   [facility], [facility] sleep centers.

## 2023-03-17 ENCOUNTER — OFFICE VISIT (OUTPATIENT)
Dept: INTERNAL MEDICINE | Facility: CLINIC | Age: 58
End: 2023-03-17
Payer: COMMERCIAL

## 2023-03-17 VITALS
HEIGHT: 69 IN | TEMPERATURE: 97.3 F | WEIGHT: 261.2 LBS | DIASTOLIC BLOOD PRESSURE: 70 MMHG | SYSTOLIC BLOOD PRESSURE: 130 MMHG | BODY MASS INDEX: 38.69 KG/M2

## 2023-03-17 DIAGNOSIS — I10 ESSENTIAL HYPERTENSION: Chronic | ICD-10-CM

## 2023-03-17 DIAGNOSIS — F41.9 ANXIETY: Chronic | ICD-10-CM

## 2023-03-17 DIAGNOSIS — E78.00 HYPERCHOLESTEROLEMIA: Chronic | ICD-10-CM

## 2023-03-17 DIAGNOSIS — K22.70 BARRETT'S ESOPHAGUS WITHOUT DYSPLASIA: Primary | ICD-10-CM

## 2023-03-17 DIAGNOSIS — R73.03 PRE-DIABETES: Chronic | ICD-10-CM

## 2023-03-17 PROCEDURE — 99214 OFFICE O/P EST MOD 30 MIN: CPT | Performed by: INTERNAL MEDICINE

## 2023-03-17 RX ORDER — ESCITALOPRAM OXALATE 20 MG/1
20 TABLET ORAL DAILY
Qty: 90 TABLET | Refills: 1 | Status: SHIPPED | OUTPATIENT
Start: 2023-03-17

## 2023-03-17 RX ORDER — OMEPRAZOLE 20 MG/1
20 CAPSULE, DELAYED RELEASE ORAL DAILY
Qty: 90 CAPSULE | Refills: 1 | Status: SHIPPED | OUTPATIENT
Start: 2023-03-17

## 2023-03-17 RX ORDER — LOSARTAN POTASSIUM 100 MG/1
100 TABLET ORAL DAILY
Qty: 90 TABLET | Refills: 1 | Status: SHIPPED | OUTPATIENT
Start: 2023-03-17

## 2023-03-17 RX ORDER — AMLODIPINE BESYLATE 10 MG/1
10 TABLET ORAL DAILY
Qty: 90 TABLET | Refills: 1 | Status: SHIPPED | OUTPATIENT
Start: 2023-03-17

## 2023-03-17 RX ORDER — ATORVASTATIN CALCIUM 40 MG/1
40 TABLET, FILM COATED ORAL DAILY
Qty: 90 TABLET | Refills: 1 | Status: SHIPPED | OUTPATIENT
Start: 2023-03-17

## 2023-03-17 RX ORDER — FENOFIBRATE 145 MG/1
145 TABLET, COATED ORAL DAILY
Qty: 90 TABLET | Refills: 1 | Status: SHIPPED | OUTPATIENT
Start: 2023-03-17

## 2023-03-17 RX ORDER — METOPROLOL SUCCINATE 25 MG/1
25 TABLET, EXTENDED RELEASE ORAL DAILY
Qty: 90 TABLET | Refills: 1 | Status: SHIPPED | OUTPATIENT
Start: 2023-03-17

## 2023-03-17 NOTE — PROGRESS NOTES
Subjective        Chief Complaint   Patient presents with   • Hypertension           Kings Mera is a 57 y.o. male who presents for    Patient Active Problem List   Diagnosis   • Essential hypertension   • DIMAS on CPAP   • Hypercholesterolemia   • Numbness and tingling in left hand   • Class 2 obesity due to excess calories without serious comorbidity with body mass index (BMI) of 36.0 to 36.9 in adult   • Pre-diabetes   • Back esophagus   • Anxiety       History of Present Illness     He is feeling much better. He stopped drinking alcohol for the last 6 weeks. He is exercising regularly. He walks. He has not been checking his BP. He is less anxious. He saw sleep medicine last week. He uses his CPAP nightly. He had an EGD and cscope with Dr. Ricci.   No Known Allergies    Current Outpatient Medications on File Prior to Visit   Medication Sig Dispense Refill   • Omega-3 Fatty Acids (FISH OIL) 1000 MG capsule capsule Take  by mouth Daily With Breakfast.     • [DISCONTINUED] amLODIPine (NORVASC) 10 MG tablet TAKE ONE TABLET BY MOUTH DAILY 90 tablet 1   • [DISCONTINUED] atorvastatin (LIPITOR) 40 MG tablet TAKE ONE TABLET BY MOUTH DAILY 90 tablet 1   • [DISCONTINUED] escitalopram (Lexapro) 20 MG tablet Take 1 tablet by mouth Daily. 30 tablet 4   • [DISCONTINUED] fenofibrate (TRICOR) 145 MG tablet Take 1 tablet by mouth Daily. 30 tablet 1   • [DISCONTINUED] losartan (COZAAR) 100 MG tablet TAKE ONE TABLET BY MOUTH DAILY 30 tablet 1   • [DISCONTINUED] metoprolol succinate XL (TOPROL-XL) 25 MG 24 hr tablet TAKE ONE TABLET BY MOUTH DAILY 30 tablet 2   • [DISCONTINUED] omeprazole (priLOSEC) 20 MG capsule TAKE ONE CAPSULE BY MOUTH DAILY 30 capsule 7     No current facility-administered medications on file prior to visit.       Past Medical History:   Diagnosis Date   • Alopecia areata    • COVID-19 01/2022   • Diverticulitis    • Fatty liver    • Gastroesophageal reflux    • Hypercalcemia 07/21/2020   • Hyperplastic colon  "polyp 2015   • Tinea versicolor    • Tinnitus        Past Surgical History:   Procedure Laterality Date   • COLONOSCOPY  2015   • COLONOSCOPY  2023    Dr. Ricci. repeat 5 years   • ENDOSCOPY  2023    Dr. Ricci; repeat 3 years   • UPPER GASTROINTESTINAL ENDOSCOPY         Family History   Problem Relation Age of Onset   • Cancer Mother    • Other Father         head injury   • Colon cancer Neg Hx    • Colon polyps Neg Hx        Social History     Socioeconomic History   • Marital status:    Tobacco Use   • Smoking status: Former     Packs/day: 1.00     Years: 4.00     Pack years: 4.00     Types: Cigarettes     Quit date:      Years since quittin.2   • Smokeless tobacco: Never   Vaping Use   • Vaping Use: Never used   Substance and Sexual Activity   • Alcohol use: Yes     Comment: 6-8 beers per week   • Drug use: No   • Sexual activity: Defer           The following portions of the patient's history were reviewed and updated as appropriate: problem list, allergies, current medications, past medical history, past family history, past social history and past surgical history.    Review of Systems    Immunization History   Administered Date(s) Administered   • COVID-19 (PFIZER) PURPLE CAP 2021, 2021   • FluLaval/Fluzone >6mos 2021   • Hepatitis A 11/10/2017, 2019   • Hepatitis B 11/10/2017, 2017, 2019   • Shingrix 2023   • Tdap 2010, 2020   • Zostavax 2014       Objective   Vitals:    23 0852   BP: 130/70   Temp: 97.3 °F (36.3 °C)   Weight: 118 kg (261 lb 3.2 oz)   Height: 174 cm (68.5\")     Body mass index is 39.14 kg/m².  Physical Exam  Vitals reviewed.   Constitutional:       Appearance: He is well-developed.   HENT:      Head: Normocephalic and atraumatic.   Cardiovascular:      Rate and Rhythm: Normal rate and regular rhythm.      Heart sounds: Normal heart sounds, S1 normal and S2 normal.   Pulmonary:      " Effort: Pulmonary effort is normal.      Breath sounds: Normal breath sounds.   Skin:     General: Skin is warm.   Neurological:      Mental Status: He is alert.   Psychiatric:         Behavior: Behavior normal.         Procedures    Assessment & Plan   Diagnoses and all orders for this visit:    1. Back's esophagus without dysplasia (Primary)  -     omeprazole (priLOSEC) 20 MG capsule; Take 1 capsule by mouth Daily.  Dispense: 90 capsule; Refill: 1    2. Essential hypertension  -     amLODIPine (NORVASC) 10 MG tablet; Take 1 tablet by mouth Daily.  Dispense: 90 tablet; Refill: 1  -     losartan (COZAAR) 100 MG tablet; Take 1 tablet by mouth Daily.  Dispense: 90 tablet; Refill: 1  -     metoprolol succinate XL (TOPROL-XL) 25 MG 24 hr tablet; Take 1 tablet by mouth Daily.  Dispense: 90 tablet; Refill: 1  -     Comprehensive Metabolic Panel; Future    3. Hypercholesterolemia  Comments:  LDL is better  Orders:  -     fenofibrate (TRICOR) 145 MG tablet; Take 1 tablet by mouth Daily.  Dispense: 90 tablet; Refill: 1  -     atorvastatin (LIPITOR) 40 MG tablet; Take 1 tablet by mouth Daily.  Dispense: 90 tablet; Refill: 1    4. Pre-diabetes  Comments:  a1c better  Orders:  -     Hemoglobin A1c; Future    5. Anxiety  Comments:  Improved  Orders:  -     escitalopram (Lexapro) 20 MG tablet; Take 1 tablet by mouth Daily.  Dispense: 90 tablet; Refill: 1               Repeat EGD in 3 years, colon in 5 years. Reviewed cmp, psa, flp, and a1c.  Return in about 6 months (around 9/17/2023) for Lab Before FUP, Annual physical.

## 2023-09-14 ENCOUNTER — HOSPITAL ENCOUNTER (EMERGENCY)
Facility: HOSPITAL | Age: 58
Discharge: HOME | End: 2023-09-14
Attending: EMERGENCY MEDICINE
Payer: COMMERCIAL

## 2023-09-14 ENCOUNTER — APPOINTMENT (OUTPATIENT)
Dept: CT IMAGING | Facility: HOSPITAL | Age: 58
End: 2023-09-14
Payer: COMMERCIAL

## 2023-09-14 VITALS
SYSTOLIC BLOOD PRESSURE: 145 MMHG | DIASTOLIC BLOOD PRESSURE: 99 MMHG | TEMPERATURE: 98.2 F | HEART RATE: 79 BPM | RESPIRATION RATE: 18 BRPM | HEIGHT: 68 IN | WEIGHT: 240 LBS | OXYGEN SATURATION: 98 % | BODY MASS INDEX: 36.37 KG/M2

## 2023-09-14 DIAGNOSIS — R10.9 FLANK PAIN: Primary | ICD-10-CM

## 2023-09-14 DIAGNOSIS — R79.89 ELEVATED SERUM CREATININE: ICD-10-CM

## 2023-09-14 LAB
ALBUMIN SERPL-MCNC: 4.7 G/DL (ref 3.5–5.2)
ALBUMIN/GLOB SERPL: 1.6 G/DL
ALP SERPL-CCNC: 48 U/L (ref 39–117)
ALT SERPL W P-5'-P-CCNC: 23 U/L (ref 1–41)
ANION GAP SERPL CALCULATED.3IONS-SCNC: 12.8 MMOL/L (ref 5–15)
AST SERPL-CCNC: 26 U/L (ref 1–40)
BACTERIA UR QL AUTO: ABNORMAL /HPF
BASOPHILS # BLD AUTO: 0.13 10*3/MM3 (ref 0–0.2)
BASOPHILS NFR BLD AUTO: 1.1 % (ref 0–1.5)
BILIRUB SERPL-MCNC: 0.8 MG/DL (ref 0–1.2)
BILIRUB UR QL STRIP: NEGATIVE
BUN SERPL-MCNC: 30 MG/DL (ref 6–20)
BUN/CREAT SERPL: 16 (ref 7–25)
CALCIUM SPEC-SCNC: 8.3 MG/DL (ref 8.6–10.5)
CHLORIDE SERPL-SCNC: 103 MMOL/L (ref 98–107)
CLARITY UR: CLEAR
CO2 SERPL-SCNC: 21.2 MMOL/L (ref 22–29)
COLOR UR: YELLOW
CREAT SERPL-MCNC: 1.88 MG/DL (ref 0.76–1.27)
D-LACTATE SERPL-SCNC: 1.3 MMOL/L (ref 0.5–2)
DEPRECATED RDW RBC AUTO: 48.7 FL (ref 37–54)
EGFRCR SERPLBLD CKD-EPI 2021: 40.6 ML/MIN/1.73
EOSINOPHIL # BLD AUTO: 0.07 10*3/MM3 (ref 0–0.4)
EOSINOPHIL NFR BLD AUTO: 0.6 % (ref 0.3–6.2)
ERYTHROCYTE [DISTWIDTH] IN BLOOD BY AUTOMATED COUNT: 13.5 % (ref 12.3–15.4)
GLOBULIN UR ELPH-MCNC: 2.9 GM/DL
GLUCOSE SERPL-MCNC: 142 MG/DL (ref 65–99)
GLUCOSE UR STRIP-MCNC: NEGATIVE MG/DL
HCT VFR BLD AUTO: 37.5 % (ref 37.5–51)
HGB BLD-MCNC: 11.9 G/DL (ref 13–17.7)
HGB UR QL STRIP.AUTO: ABNORMAL
HYALINE CASTS UR QL AUTO: ABNORMAL /LPF
IMM GRANULOCYTES # BLD AUTO: 0.04 10*3/MM3 (ref 0–0.05)
IMM GRANULOCYTES NFR BLD AUTO: 0.3 % (ref 0–0.5)
KETONES UR QL STRIP: NEGATIVE
LEUKOCYTE ESTERASE UR QL STRIP.AUTO: NEGATIVE
LIPASE SERPL-CCNC: 43 U/L (ref 13–60)
LYMPHOCYTES # BLD AUTO: 1.19 10*3/MM3 (ref 0.7–3.1)
LYMPHOCYTES NFR BLD AUTO: 10.1 % (ref 19.6–45.3)
MCH RBC QN AUTO: 30.5 PG (ref 26.6–33)
MCHC RBC AUTO-ENTMCNC: 31.7 G/DL (ref 31.5–35.7)
MCV RBC AUTO: 96.2 FL (ref 79–97)
MONOCYTES # BLD AUTO: 1.11 10*3/MM3 (ref 0.1–0.9)
MONOCYTES NFR BLD AUTO: 9.4 % (ref 5–12)
NEUTROPHILS NFR BLD AUTO: 78.5 % (ref 42.7–76)
NEUTROPHILS NFR BLD AUTO: 9.23 10*3/MM3 (ref 1.7–7)
NITRITE UR QL STRIP: NEGATIVE
PH UR STRIP.AUTO: 5.5 [PH] (ref 5–8)
PLATELET # BLD AUTO: 296 10*3/MM3 (ref 140–450)
PMV BLD AUTO: 9.8 FL (ref 6–12)
POTASSIUM SERPL-SCNC: 4.1 MMOL/L (ref 3.5–5.2)
PROT SERPL-MCNC: 7.6 G/DL (ref 6–8.5)
PROT UR QL STRIP: ABNORMAL
RBC # BLD AUTO: 3.9 10*6/MM3 (ref 4.14–5.8)
RBC # UR STRIP: ABNORMAL /HPF
REF LAB TEST METHOD: ABNORMAL
SODIUM SERPL-SCNC: 137 MMOL/L (ref 136–145)
SP GR UR STRIP: 1.02 (ref 1–1.03)
SQUAMOUS #/AREA URNS HPF: ABNORMAL /HPF
UROBILINOGEN UR QL STRIP: ABNORMAL
WBC # UR STRIP: ABNORMAL /HPF
WBC NRBC COR # BLD: 11.77 10*3/MM3 (ref 3.4–10.8)

## 2023-09-14 PROCEDURE — 83690 ASSAY OF LIPASE: CPT | Performed by: EMERGENCY MEDICINE

## 2023-09-14 PROCEDURE — 83605 ASSAY OF LACTIC ACID: CPT | Performed by: EMERGENCY MEDICINE

## 2023-09-14 PROCEDURE — 99284 EMERGENCY DEPT VISIT MOD MDM: CPT

## 2023-09-14 PROCEDURE — 96361 HYDRATE IV INFUSION ADD-ON: CPT

## 2023-09-14 PROCEDURE — 25010000002 ONDANSETRON PER 1 MG: Performed by: NURSE PRACTITIONER

## 2023-09-14 PROCEDURE — 85025 COMPLETE CBC W/AUTO DIFF WBC: CPT | Performed by: EMERGENCY MEDICINE

## 2023-09-14 PROCEDURE — 96374 THER/PROPH/DIAG INJ IV PUSH: CPT

## 2023-09-14 PROCEDURE — 80053 COMPREHEN METABOLIC PANEL: CPT | Performed by: EMERGENCY MEDICINE

## 2023-09-14 PROCEDURE — 81001 URINALYSIS AUTO W/SCOPE: CPT | Performed by: EMERGENCY MEDICINE

## 2023-09-14 PROCEDURE — 74176 CT ABD & PELVIS W/O CONTRAST: CPT

## 2023-09-14 RX ORDER — SODIUM CHLORIDE 0.9 % (FLUSH) 0.9 %
10 SYRINGE (ML) INJECTION AS NEEDED
Status: DISCONTINUED | OUTPATIENT
Start: 2023-09-14 | End: 2023-09-14 | Stop reason: HOSPADM

## 2023-09-14 RX ORDER — ONDANSETRON 2 MG/ML
4 INJECTION INTRAMUSCULAR; INTRAVENOUS ONCE
Status: COMPLETED | OUTPATIENT
Start: 2023-09-14 | End: 2023-09-14

## 2023-09-14 RX ADMIN — ONDANSETRON 4 MG: 2 INJECTION INTRAMUSCULAR; INTRAVENOUS at 12:40

## 2023-09-14 RX ADMIN — SODIUM CHLORIDE 1000 ML: 9 INJECTION, SOLUTION INTRAVENOUS at 10:33

## 2023-09-14 RX ADMIN — SODIUM CHLORIDE 1000 ML: 9 INJECTION, SOLUTION INTRAVENOUS at 12:40

## 2023-09-14 NOTE — FSED PROVIDER NOTE
Subjective   History of Present Illness  Patient is a 57-year-old male who presents complaining of left flank and lower abdominal pain that started last night.  Complains of nausea and difficulty urinating.  Denies any vomiting, fever, chills.  Denies any prior history of kidney stones.    Review of Systems   Gastrointestinal:  Positive for nausea.   Genitourinary:  Positive for dysuria and flank pain.   All other systems reviewed and are negative.    Past Medical History:   Diagnosis Date    Alopecia areata     COVID-19 2022    Diverticulitis     Fatty liver     Gastroesophageal reflux     Hypercalcemia 2020    Hyperplastic colon polyp     Tinea versicolor     Tinnitus        No Known Allergies    Past Surgical History:   Procedure Laterality Date    COLONOSCOPY  2015    COLONOSCOPY  2023    Dr. Ricci. repeat 5 years    ENDOSCOPY  2023    Dr. Ricci; repeat 3 years    UPPER GASTROINTESTINAL ENDOSCOPY  2019       Family History   Problem Relation Age of Onset    Cancer Mother     Other Father         head injury    Colon cancer Neg Hx     Colon polyps Neg Hx        Social History     Socioeconomic History    Marital status:    Tobacco Use    Smoking status: Former     Packs/day: 1.00     Years: 4.00     Pack years: 4.00     Types: Cigarettes     Quit date:      Years since quittin.7    Smokeless tobacco: Never   Vaping Use    Vaping Use: Never used   Substance and Sexual Activity    Alcohol use: Yes     Comment: 6-8 beers per week    Drug use: No    Sexual activity: Defer           Objective   Physical Exam  Vitals and nursing note reviewed.   Constitutional:       Appearance: Normal appearance.   HENT:      Head: Normocephalic and atraumatic.   Pulmonary:      Effort: Pulmonary effort is normal.   Abdominal:      General: Abdomen is flat.      Tenderness: There is abdominal tenderness (Left-sided).   Musculoskeletal:      Cervical back: Normal range of motion and neck  supple.   Skin:     General: Skin is warm and dry.      Capillary Refill: Capillary refill takes less than 2 seconds.   Neurological:      General: No focal deficit present.      Mental Status: He is alert and oriented to person, place, and time.       Procedures           ED Course  ED Course as of 09/14/23 1221   Thu Sep 14, 2023   1103 Creatinine(!): 1.88 [RL]      ED Course User Index  [RL] Radha Suarez APRN                                           Medical Decision Making  CT findings as below:    IMPRESSION:  1. FINDINGS compatible with infection or recently passed stone on the  left. No current renal, ureteral, or urinary bladder calculus seen    Patient noted to have large amount of blood in his urine.  UA does not appear infected.  Patient is afebrile, no leukocytosis.  He does have a creatinine of 1.8.  He was hydrated with 2 L of fluid in the ER.  He is to follow-up with his primary care early next week to have these labs redrawn.  He was advised to push p.o. fluids over the weekend and was given strict return precautions.    Problems Addressed:  Elevated serum creatinine: complicated acute illness or injury  Flank pain: complicated acute illness or injury    Amount and/or Complexity of Data Reviewed  Labs: ordered. Decision-making details documented in ED Course.  Radiology: ordered.    Risk  Prescription drug management.        Final diagnoses:   Flank pain   Elevated serum creatinine       ED Disposition  ED Disposition       ED Disposition   Discharge    Condition   Stable    Comment   --               Moncho Gonzalez MD  [address] [address] [phone]    Schedule an appointment as soon as possible for a visit in 3 days           Medication List      No changes were made to your prescriptions during this visit.

## 2023-09-14 NOTE — DISCHARGE INSTRUCTIONS
It appears you have passed a kidney stone today.  Your creatinine which is a measure of your kidney function was elevated at 1.8.  Please make sure to increase your fluid intake and follow-up with your primary care have your labs redrawn early next week.  Return to the ER with worsening symptoms

## 2023-09-15 ENCOUNTER — TELEPHONE (OUTPATIENT)
Dept: INTERNAL MEDICINE | Facility: CLINIC | Age: 58
End: 2023-09-15
Payer: COMMERCIAL

## 2023-09-25 ENCOUNTER — OFFICE VISIT (OUTPATIENT)
Dept: INTERNAL MEDICINE | Facility: CLINIC | Age: 58
End: 2023-09-25

## 2023-09-25 VITALS
HEART RATE: 60 BPM | SYSTOLIC BLOOD PRESSURE: 142 MMHG | DIASTOLIC BLOOD PRESSURE: 82 MMHG | HEIGHT: 68 IN | WEIGHT: 281.2 LBS | BODY MASS INDEX: 42.62 KG/M2 | TEMPERATURE: 97.7 F

## 2023-09-25 DIAGNOSIS — G47.33 OSA ON CPAP: ICD-10-CM

## 2023-09-25 DIAGNOSIS — Z00.00 WELLNESS EXAMINATION: Primary | ICD-10-CM

## 2023-09-25 DIAGNOSIS — R73.03 PRE-DIABETES: Chronic | ICD-10-CM

## 2023-09-25 DIAGNOSIS — I10 ESSENTIAL HYPERTENSION: Chronic | ICD-10-CM

## 2023-09-25 DIAGNOSIS — F41.9 ANXIETY: Chronic | ICD-10-CM

## 2023-09-25 PROCEDURE — 99396 PREV VISIT EST AGE 40-64: CPT | Performed by: INTERNAL MEDICINE

## 2023-09-25 RX ORDER — METOPROLOL SUCCINATE 50 MG/1
50 TABLET, EXTENDED RELEASE ORAL DAILY
Qty: 30 TABLET | Refills: 2 | Status: SHIPPED | OUTPATIENT
Start: 2023-09-25

## 2023-09-25 NOTE — PROGRESS NOTES
Subjective        Chief Complaint   Patient presents with    Annual Exam           Kings Mera is a 57 y.o. male who presents for    Patient Active Problem List   Diagnosis    Essential hypertension    DIMAS on CPAP    Hypercholesterolemia    Numbness and tingling in left hand    Class 2 obesity due to excess calories without serious comorbidity with body mass index (BMI) of 36.0 to 36.9 in adult    Pre-diabetes    Back esophagus    Anxiety       History of Present Illness       He has felt good the last 2-3 weeks. He can have fatigue sometimes. He denies dysphagia, chest pain, dyspnea, or depression. He thinks it might be related to stress with work. He uses his CPAP nightly. He is not exercising much. He was in the ER on 9/14 for a kidney stone. He did pass it.   No Known Allergies    Current Outpatient Medications on File Prior to Visit   Medication Sig Dispense Refill    amLODIPine (NORVASC) 10 MG tablet Take 1 tablet by mouth Daily. 90 tablet 1    atorvastatin (LIPITOR) 40 MG tablet Take 1 tablet by mouth Daily. 90 tablet 1    escitalopram (Lexapro) 20 MG tablet Take 1 tablet by mouth Daily. 90 tablet 1    fenofibrate (TRICOR) 145 MG tablet Take 1 tablet by mouth Daily. 90 tablet 1    losartan (COZAAR) 100 MG tablet Take 1 tablet by mouth Daily. 90 tablet 1    Omega-3 Fatty Acids (FISH OIL) 1000 MG capsule capsule Take  by mouth Daily With Breakfast.      omeprazole (priLOSEC) 20 MG capsule Take 1 capsule by mouth Daily. 90 capsule 1    [DISCONTINUED] metoprolol succinate XL (TOPROL-XL) 25 MG 24 hr tablet Take 1 tablet by mouth Daily. 90 tablet 1     No current facility-administered medications on file prior to visit.       Past Medical History:   Diagnosis Date    Alopecia areata     COVID-19 01/2022    Diverticulitis     Fatty liver     Gastroesophageal reflux     Hypercalcemia 07/21/2020    Hyperplastic colon polyp 2015    Kidney stones 09/21/2023    Tinea versicolor     Tinnitus      Class 3 Severe  "Obesity (BMI >=40). Obesity-related health conditions include the following: obstructive sleep apnea, hypertension, and dyslipidemias. Obesity is worsening. BMI is is above average; BMI management plan is completed. We discussed portion control and increasing exercise.   Past Surgical History:   Procedure Laterality Date    COLONOSCOPY  2015    COLONOSCOPY  2023    Dr. Ricci. repeat 5 years    ENDOSCOPY  2023    Dr. Ricci; repeat 3 years    UPPER GASTROINTESTINAL ENDOSCOPY  2019       Family History   Problem Relation Age of Onset    Cancer Mother     Other Father         head injury    Colon cancer Neg Hx     Colon polyps Neg Hx        Social History     Socioeconomic History    Marital status:    Tobacco Use    Smoking status: Former     Packs/day: 1.00     Years: 4.00     Pack years: 4.00     Types: Cigarettes     Quit date:      Years since quittin.7    Smokeless tobacco: Never   Vaping Use    Vaping Use: Never used   Substance and Sexual Activity    Alcohol use: Yes     Comment: 6-8 beers per week    Drug use: No    Sexual activity: Defer           The following portions of the patient's history were reviewed and updated as appropriate: problem list, allergies, current medications, past medical history, past family history, past social history, and past surgical history.    Review of Systems    Immunization History   Administered Date(s) Administered    COVID-19 (PFIZER) Purple Cap Monovalent 2021, 2021    Fluzone (or Fluarix & Flulaval for VFC) >6mos 2021    Hepatitis A 11/10/2017, 2019    Hepatitis B Adult/Adolescent IM 11/10/2017, 2017, 2019    Shingrix 2023, 2023    Tdap 2010, 2020    Zostavax 2014       Objective   Vitals:    23 0825   BP: 142/82   Pulse: 60   Temp: 97.7 °F (36.5 °C)   Weight: 128 kg (281 lb 3.2 oz)   Height: 172.7 cm (68\")     Body mass index is 42.76 kg/m².  Physical Exam  Vitals " reviewed.   Constitutional:       Appearance: He is well-developed.   HENT:      Head: Normocephalic and atraumatic.      Mouth/Throat:      Mouth: Mucous membranes are moist.      Pharynx: Oropharynx is clear.   Eyes:      Extraocular Movements: Extraocular movements intact.      Conjunctiva/sclera: Conjunctivae normal.      Pupils: Pupils are equal, round, and reactive to light.   Neck:      Thyroid: No thyromegaly.      Vascular: No carotid bruit.   Cardiovascular:      Rate and Rhythm: Normal rate and regular rhythm.      Heart sounds: Normal heart sounds. No murmur heard.  Pulmonary:      Effort: Pulmonary effort is normal.      Breath sounds: Normal breath sounds.   Abdominal:      General: There is no distension.      Palpations: Abdomen is soft. There is no mass.      Tenderness: There is no abdominal tenderness. There is no rebound.   Musculoskeletal:      Cervical back: Neck supple.   Lymphadenopathy:      Cervical: No cervical adenopathy.   Skin:     General: Skin is warm.   Neurological:      Mental Status: He is alert.   Psychiatric:         Behavior: Behavior normal.       Procedures    Assessment & Plan   Diagnoses and all orders for this visit:    1. Wellness examination (Primary)    2. Essential hypertension  -     metoprolol succinate XL (Toprol XL) 50 MG 24 hr tablet; Take 1 tablet by mouth Daily.  Dispense: 30 tablet; Refill: 2    3. Pre-diabetes  Comments:  better    4. Anxiety  Comments:  stable    5. DIMAS on CPAP  Comments:  uses CPAP               Recc flu shot at work. Cscope is UTD. Discussed exercising 150 minutes per week. Reviewed labs.  Increase Toprol and decrease beer especially with wt gain and fatty liver.    Return in about 4 weeks (around 10/23/2023).

## 2023-10-14 DIAGNOSIS — E78.00 HYPERCHOLESTEROLEMIA: Chronic | ICD-10-CM

## 2023-10-16 RX ORDER — FENOFIBRATE 145 MG/1
145 TABLET, COATED ORAL DAILY
Qty: 30 TABLET | Refills: 3 | Status: SHIPPED | OUTPATIENT
Start: 2023-10-16

## 2023-10-23 DIAGNOSIS — I10 ESSENTIAL HYPERTENSION: Chronic | ICD-10-CM

## 2023-10-23 RX ORDER — LOSARTAN POTASSIUM 100 MG/1
100 TABLET ORAL DAILY
Qty: 30 TABLET | Refills: 0 | Status: SHIPPED | OUTPATIENT
Start: 2023-10-23

## 2023-10-24 ENCOUNTER — OFFICE VISIT (OUTPATIENT)
Dept: INTERNAL MEDICINE | Facility: CLINIC | Age: 58
End: 2023-10-24
Payer: COMMERCIAL

## 2023-10-24 VITALS
BODY MASS INDEX: 41.98 KG/M2 | DIASTOLIC BLOOD PRESSURE: 82 MMHG | WEIGHT: 277 LBS | HEIGHT: 68 IN | HEART RATE: 60 BPM | TEMPERATURE: 97.3 F | SYSTOLIC BLOOD PRESSURE: 132 MMHG

## 2023-10-24 DIAGNOSIS — Z23 NEED FOR INFLUENZA VACCINATION: ICD-10-CM

## 2023-10-24 DIAGNOSIS — Z12.5 PROSTATE CANCER SCREENING: ICD-10-CM

## 2023-10-24 DIAGNOSIS — R73.03 PRE-DIABETES: Primary | Chronic | ICD-10-CM

## 2023-10-24 DIAGNOSIS — I10 ESSENTIAL HYPERTENSION: Chronic | ICD-10-CM

## 2023-10-24 DIAGNOSIS — E78.00 HYPERCHOLESTEROLEMIA: Chronic | ICD-10-CM

## 2023-10-24 PROCEDURE — 99214 OFFICE O/P EST MOD 30 MIN: CPT | Performed by: INTERNAL MEDICINE

## 2023-10-24 NOTE — PROGRESS NOTES
Subjective        Chief Complaint   Patient presents with    Hypertension           Kings Mera is a 57 y.o. male who presents for    Patient Active Problem List   Diagnosis    Essential hypertension    DIMAS on CPAP    Hypercholesterolemia    Numbness and tingling in left hand    Class 2 obesity due to excess calories without serious comorbidity with body mass index (BMI) of 36.0 to 36.9 in adult    Pre-diabetes    Back esophagus    Anxiety       History of Present Illness     He has decreased his beer to 6-8 per week. He is staying active but is not exercising. He denies chest pain. He had a cold for 8 days; he is much better. He was in the ER for a kidney stone. He passed it in the ER.    No Known Allergies    Current Outpatient Medications on File Prior to Visit   Medication Sig Dispense Refill    amLODIPine (NORVASC) 10 MG tablet Take 1 tablet by mouth Daily. 90 tablet 1    atorvastatin (LIPITOR) 40 MG tablet Take 1 tablet by mouth Daily. 90 tablet 1    escitalopram (Lexapro) 20 MG tablet Take 1 tablet by mouth Daily. 90 tablet 1    fenofibrate (TRICOR) 145 MG tablet TAKE ONE TABLET BY MOUTH DAILY 30 tablet 3    losartan (COZAAR) 100 MG tablet TAKE ONE TABLET BY MOUTH DAILY 30 tablet 0    metoprolol succinate XL (Toprol XL) 50 MG 24 hr tablet Take 1 tablet by mouth Daily. 30 tablet 2    Omega-3 Fatty Acids (FISH OIL) 1000 MG capsule capsule Take  by mouth Daily With Breakfast.      omeprazole (priLOSEC) 20 MG capsule Take 1 capsule by mouth Daily. 90 capsule 1     No current facility-administered medications on file prior to visit.       Past Medical History:   Diagnosis Date    Alopecia areata     COVID-19 01/2022    Diverticulitis     Fatty liver     Gastroesophageal reflux     Hypercalcemia 07/21/2020    Hyperplastic colon polyp 2015    Kidney stones 09/21/2023    Tinea versicolor     Tinnitus        Past Surgical History:   Procedure Laterality Date    COLONOSCOPY  08/28/2015    COLONOSCOPY  01/13/2023     "Dr. Ricci. repeat 5 years    ENDOSCOPY  2023    Dr. Ricci; repeat 3 years    UPPER GASTROINTESTINAL ENDOSCOPY  2019       Family History   Problem Relation Age of Onset    Cancer Mother     Other Father         head injury    Colon cancer Neg Hx     Colon polyps Neg Hx        Social History     Socioeconomic History    Marital status:    Tobacco Use    Smoking status: Former     Packs/day: 1.00     Years: 4.00     Additional pack years: 0.00     Total pack years: 4.00     Types: Cigarettes     Quit date:      Years since quittin.8    Smokeless tobacco: Never   Vaping Use    Vaping Use: Never used   Substance and Sexual Activity    Alcohol use: Yes     Comment: 6-8 beers per week    Drug use: No    Sexual activity: Defer           The following portions of the patient's history were reviewed and updated as appropriate: problem list, allergies, current medications, past medical history, past family history, past social history, and past surgical history.    Review of Systems    Immunization History   Administered Date(s) Administered    COVID-19 (PFIZER) Purple Cap Monovalent 2021, 2021    Fluzone (or Fluarix & Flulaval for VFC) >6mos 2021    Hepatitis A 11/10/2017, 2019    Hepatitis B Adult/Adolescent IM 11/10/2017, 2017, 2019    Shingrix 2023, 2023    Tdap 2010, 2020    Zostavax 2014       Objective   Vitals:    10/24/23 1028   BP: 132/82   Pulse: 60   Temp: 97.3 °F (36.3 °C)   Weight: 126 kg (277 lb)   Height: 172.7 cm (67.99\")     Body mass index is 42.13 kg/m².  Physical Exam  Vitals reviewed.   Constitutional:       Appearance: He is well-developed.   HENT:      Head: Normocephalic and atraumatic.   Cardiovascular:      Rate and Rhythm: Normal rate and regular rhythm.      Heart sounds: Normal heart sounds, S1 normal and S2 normal.   Pulmonary:      Effort: Pulmonary effort is normal.      Breath sounds: Normal breath " sounds.   Skin:     General: Skin is warm.   Neurological:      Mental Status: He is alert.   Psychiatric:         Behavior: Behavior normal.         Procedures    Assessment & Plan   Diagnoses and all orders for this visit:    1. Pre-diabetes (Primary)  -     Hemoglobin A1c; Future    2. Essential hypertension  -     CBC & Differential  -     Comprehensive Metabolic Panel; Future    3. Hypercholesterolemia  -     Lipid Panel With / Chol / HDL Ratio; Future    4. Prostate cancer screening  -     PSA Screen; Future               Flu shot today. BP is good. Repeat CBC since low in ER last month.Recc more wt loss with fatty liver.    Return in about 5 months (around 3/24/2024) for Lab Before FUP, Lab Today.

## 2023-10-25 LAB
BASOPHILS # BLD AUTO: 0.08 10*3/MM3 (ref 0–0.2)
BASOPHILS NFR BLD AUTO: 0.9 % (ref 0–1.5)
EOSINOPHIL # BLD AUTO: 0.21 10*3/MM3 (ref 0–0.4)
EOSINOPHIL NFR BLD AUTO: 2.5 % (ref 0.3–6.2)
ERYTHROCYTE [DISTWIDTH] IN BLOOD BY AUTOMATED COUNT: 12.5 % (ref 12.3–15.4)
HCT VFR BLD AUTO: 37.2 % (ref 37.5–51)
HGB BLD-MCNC: 12.6 G/DL (ref 13–17.7)
IMM GRANULOCYTES # BLD AUTO: 0.07 10*3/MM3 (ref 0–0.05)
IMM GRANULOCYTES NFR BLD AUTO: 0.8 % (ref 0–0.5)
LYMPHOCYTES # BLD AUTO: 2.33 10*3/MM3 (ref 0.7–3.1)
LYMPHOCYTES NFR BLD AUTO: 27.2 % (ref 19.6–45.3)
MCH RBC QN AUTO: 31.5 PG (ref 26.6–33)
MCHC RBC AUTO-ENTMCNC: 33.9 G/DL (ref 31.5–35.7)
MCV RBC AUTO: 93 FL (ref 79–97)
MONOCYTES # BLD AUTO: 0.82 10*3/MM3 (ref 0.1–0.9)
MONOCYTES NFR BLD AUTO: 9.6 % (ref 5–12)
NEUTROPHILS # BLD AUTO: 5.06 10*3/MM3 (ref 1.7–7)
NEUTROPHILS NFR BLD AUTO: 59 % (ref 42.7–76)
NRBC BLD AUTO-RTO: 0 /100 WBC (ref 0–0.2)
PLATELET # BLD AUTO: 315 10*3/MM3 (ref 140–450)
RBC # BLD AUTO: 4 10*6/MM3 (ref 4.14–5.8)
WBC # BLD AUTO: 8.57 10*3/MM3 (ref 3.4–10.8)

## 2023-10-26 LAB
SPECIMEN STATUS: NORMAL
WRITTEN AUTHORIZATION: NORMAL

## 2023-10-27 DIAGNOSIS — E61.1 IRON DEFICIENCY: Primary | ICD-10-CM

## 2023-11-26 DIAGNOSIS — I10 ESSENTIAL HYPERTENSION: Chronic | ICD-10-CM

## 2023-11-27 RX ORDER — LOSARTAN POTASSIUM 100 MG/1
100 TABLET ORAL DAILY
Qty: 30 TABLET | Refills: 4 | Status: SHIPPED | OUTPATIENT
Start: 2023-11-27

## 2023-11-30 DIAGNOSIS — F41.9 ANXIETY: Chronic | ICD-10-CM

## 2023-11-30 DIAGNOSIS — I10 ESSENTIAL HYPERTENSION: Chronic | ICD-10-CM

## 2023-11-30 RX ORDER — AMLODIPINE BESYLATE 10 MG/1
10 TABLET ORAL DAILY
Qty: 90 TABLET | Refills: 1 | Status: SHIPPED | OUTPATIENT
Start: 2023-11-30

## 2023-11-30 RX ORDER — ESCITALOPRAM OXALATE 20 MG/1
20 TABLET ORAL DAILY
Qty: 90 TABLET | Refills: 1 | Status: SHIPPED | OUTPATIENT
Start: 2023-11-30

## 2023-12-08 DIAGNOSIS — I10 ESSENTIAL HYPERTENSION: Chronic | ICD-10-CM

## 2023-12-08 RX ORDER — METOPROLOL SUCCINATE 25 MG/1
25 TABLET, EXTENDED RELEASE ORAL DAILY
Qty: 90 TABLET | Refills: 1 | OUTPATIENT
Start: 2023-12-08

## 2023-12-28 DIAGNOSIS — I10 ESSENTIAL HYPERTENSION: Chronic | ICD-10-CM

## 2023-12-28 DIAGNOSIS — K22.70 BARRETT'S ESOPHAGUS WITHOUT DYSPLASIA: ICD-10-CM

## 2023-12-28 RX ORDER — OMEPRAZOLE 20 MG/1
20 CAPSULE, DELAYED RELEASE ORAL DAILY
Qty: 30 CAPSULE | Refills: 3 | Status: SHIPPED | OUTPATIENT
Start: 2023-12-28

## 2023-12-28 RX ORDER — METOPROLOL SUCCINATE 50 MG/1
50 TABLET, EXTENDED RELEASE ORAL DAILY
Qty: 30 TABLET | Refills: 2 | Status: SHIPPED | OUTPATIENT
Start: 2023-12-28

## 2024-01-12 DIAGNOSIS — E78.00 HYPERCHOLESTEROLEMIA: Chronic | ICD-10-CM

## 2024-01-12 DIAGNOSIS — I10 ESSENTIAL HYPERTENSION: Chronic | ICD-10-CM

## 2024-01-12 RX ORDER — ATORVASTATIN CALCIUM 40 MG/1
40 TABLET, FILM COATED ORAL DAILY
Qty: 90 TABLET | Refills: 1 | Status: SHIPPED | OUTPATIENT
Start: 2024-01-12

## 2024-01-12 RX ORDER — ATORVASTATIN CALCIUM 40 MG/1
40 TABLET, FILM COATED ORAL DAILY
Qty: 90 TABLET | Refills: 1 | OUTPATIENT
Start: 2024-01-12

## 2024-01-12 RX ORDER — METOPROLOL SUCCINATE 50 MG/1
50 TABLET, EXTENDED RELEASE ORAL DAILY
Qty: 30 TABLET | Refills: 2 | Status: SHIPPED | OUTPATIENT
Start: 2024-01-12

## 2024-01-12 RX ORDER — METOPROLOL SUCCINATE 25 MG/1
25 TABLET, EXTENDED RELEASE ORAL DAILY
Qty: 90 TABLET | Refills: 1 | OUTPATIENT
Start: 2024-01-12

## 2024-01-12 NOTE — TELEPHONE ENCOUNTER
Refilled metoprolol and atorvastatin at appropriate dosing per most recent PCP note. Refused these as metoprolol dose incorrect compared to chart review.

## 2024-01-15 DIAGNOSIS — E78.00 HYPERCHOLESTEROLEMIA: Chronic | ICD-10-CM

## 2024-01-15 RX ORDER — FENOFIBRATE 145 MG/1
145 TABLET, COATED ORAL DAILY
Qty: 30 TABLET | Refills: 3 | Status: SHIPPED | OUTPATIENT
Start: 2024-01-15

## 2024-01-17 DIAGNOSIS — E78.00 HYPERCHOLESTEROLEMIA: Chronic | ICD-10-CM

## 2024-01-17 RX ORDER — FENOFIBRATE 145 MG/1
145 TABLET, COATED ORAL DAILY
Qty: 90 TABLET | OUTPATIENT
Start: 2024-01-17

## 2024-03-21 ENCOUNTER — OFFICE VISIT (OUTPATIENT)
Dept: SLEEP MEDICINE | Facility: HOSPITAL | Age: 59
End: 2024-03-21
Payer: COMMERCIAL

## 2024-03-21 VITALS
HEART RATE: 71 BPM | WEIGHT: 290 LBS | BODY MASS INDEX: 43.95 KG/M2 | SYSTOLIC BLOOD PRESSURE: 142 MMHG | HEIGHT: 68 IN | OXYGEN SATURATION: 93 % | DIASTOLIC BLOOD PRESSURE: 76 MMHG

## 2024-03-21 DIAGNOSIS — E66.09 CLASS 2 OBESITY DUE TO EXCESS CALORIES WITHOUT SERIOUS COMORBIDITY WITH BODY MASS INDEX (BMI) OF 36.0 TO 36.9 IN ADULT: ICD-10-CM

## 2024-03-21 DIAGNOSIS — G47.33 OSA ON CPAP: Primary | ICD-10-CM

## 2024-03-21 PROCEDURE — G0463 HOSPITAL OUTPT CLINIC VISIT: HCPCS

## 2024-03-21 NOTE — PROGRESS NOTES
"  [facility]  [address]  [address]  [place] [address]  Phone   Fax       SLEEP CLINIC FOLLOW UP PROGRESS NOTE.    Kings Mera  [id number]   1965  58 y.o.  male      PCP: Moncho Gonzalez MD      Date of visit: 3/21/2024    Chief Complaint   Patient presents with    Sleep Apnea    Obesity       HPI:  This is a 58 y.o. years old patient is here for the management of obstructive sleep apnea.  Sleep apnea is mild in severity with a AHI of 9/hr. Patient is using positive airway pressure therapy with auto CPAP and the symptoms of sleep apnea have improved significantly on the therapy. Normally patient goes to bed at 9 PM and wakes up at 6 AM .  The patient wakes up 3 time(s) during the night and has no problem going back to sleep.  Feels refreshed after waking up.  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     SOCIAL (habits pertaining to sleep medicine)  History tobacco use:No   History of alcohol use: 10 per week  Caffeine use: 3     REVIEW OF SYSTEMS:   Pertaining positive symptoms are:  [place] Sleepiness Scale :Total score: 11   Anxiety      PHYSICAL EXAMINATION:  CONSTITUTIONAL:  Vitals:    03/21/24 0807   BP: 142/76   Pulse: 71   SpO2: 93%   Weight: 132 kg (290 lb)   Height: 172.7 cm (67.99\")    Body mass index is 44.11 kg/m².   NOSE: nasal passages are clear, No deformities noted   RESP SYSTEM: Not in any respiratory distress, no chest deformities noted,   CARDIOVASULAR: No edema noted  NEURO: Oriented x 3, gait normal,  Mood and affect appeared appropriate      Data reviewed:  The Smart card downloaded on 3/21/2024 has been reviewed independently by me for compliance and discussed the data with the patient.   Compliance; 100%  More than 4 hr use, 100%  Average use of the device 12 hours per night  Residual AHI: 0.5 /hr " (goal < 5.0 /hr)  Mask type: Nasal pillows  Device: ResMed  DME: [employer]      ASSESSMENT AND PLAN:  Obstructive sleep apnea ( G 47.33).  The symptoms of sleep apnea have improved with the device and the treatment.  Patient's compliance with the device is excellent for treatment of sleep apnea.  I have independently reviewed the smart card down load and discussed with the patient the download data and encouarged the patient to continue to use the device.The residual AHI is acceptable. The device is benefiting the patient and the device is medically necessary.  Without proper control of sleep apnea and good compliance there is a increased risk for hypertension, diabetes mellitus and nonrestorative sleep with hypersomnia which can increase risk for motor vehicle accidents.  Untreated sleep apnea is also a risk factor for development of atrial fibrillation, pulmonary hypertension, insulin resistance and stroke. The patient is also instructed to get the supplies from the DME company and and change them on a regular basis.  A prescription for supplies has been sent to the DME company.  I have also discussed the good sleep hygiene habits and adequate amount of sleep needed for good health.  Obesity  3 with BMI is Body mass index is 44.11 kg/m².. I have discuss the relationship between the weight and sleep apnea. The benefit of weight loss in reducing severity of sleep apnea was discussed. Discussed diet and exercise with the patient to achieve ideal BMI.   Return in about 1 year (around 3/21/2025) for with smart card down load. . Patient's questions were answered.    3/21/2024  Lg Estrada MD  Sleep Medicine.  Medical Director,   [facility], [facility] sleep centers.

## 2024-03-28 ENCOUNTER — OFFICE VISIT (OUTPATIENT)
Dept: INTERNAL MEDICINE | Facility: CLINIC | Age: 59
End: 2024-03-28
Payer: COMMERCIAL

## 2024-03-28 VITALS
SYSTOLIC BLOOD PRESSURE: 132 MMHG | DIASTOLIC BLOOD PRESSURE: 82 MMHG | HEIGHT: 68 IN | WEIGHT: 290.8 LBS | BODY MASS INDEX: 44.07 KG/M2

## 2024-03-28 DIAGNOSIS — E78.00 HYPERCHOLESTEROLEMIA: Chronic | ICD-10-CM

## 2024-03-28 DIAGNOSIS — F41.9 ANXIETY: Primary | Chronic | ICD-10-CM

## 2024-03-28 DIAGNOSIS — K76.0 FATTY LIVER: ICD-10-CM

## 2024-03-28 DIAGNOSIS — I10 ESSENTIAL HYPERTENSION: Chronic | ICD-10-CM

## 2024-03-28 DIAGNOSIS — R73.03 PRE-DIABETES: Chronic | ICD-10-CM

## 2024-03-28 PROCEDURE — 99214 OFFICE O/P EST MOD 30 MIN: CPT | Performed by: INTERNAL MEDICINE

## 2024-03-28 NOTE — PROGRESS NOTES
Subjective        Chief Complaint   Patient presents with    Prediabetes           Kings Mera is a 58 y.o. male who presents for    Patient Active Problem List   Diagnosis    Essential hypertension    DIMAS on CPAP    Hypercholesterolemia    Numbness and tingling in left hand    Class 2 obesity due to excess calories without serious comorbidity with body mass index (BMI) of 36.0 to 36.9 in adult    Pre-diabetes    Back esophagus    Anxiety       History of Present Illness     He stopped lexapro and he feels much better. He was very sleepy with it. He is not depressed. He does have anxiety but he wants to stay off of meds for it. He has a lot of stress with work. He is getting about 10K steps daily. He has decreased his beer to 6-8 on the weekends. He denies melena, hematochezia, or chest pain.    No Known Allergies    Current Outpatient Medications on File Prior to Visit   Medication Sig Dispense Refill    amLODIPine (NORVASC) 10 MG tablet TAKE ONE TABLET BY MOUTH DAILY 90 tablet 1    atorvastatin (LIPITOR) 40 MG tablet Take 1 tablet by mouth Daily. 90 tablet 1    fenofibrate (TRICOR) 145 MG tablet Take 1 tablet by mouth Daily. 30 tablet 3    losartan (COZAAR) 100 MG tablet TAKE 1 TABLET BY MOUTH DAILY 30 tablet 4    metoprolol succinate XL (TOPROL-XL) 50 MG 24 hr tablet Take 1 tablet by mouth Daily. 30 tablet 2    Omega-3 Fatty Acids (FISH OIL) 1000 MG capsule capsule Take  by mouth Daily With Breakfast.      omeprazole (priLOSEC) 20 MG capsule TAKE ONE CAPSULE BY MOUTH DAILY 30 capsule 3    [DISCONTINUED] escitalopram (LEXAPRO) 20 MG tablet TAKE ONE TABLET BY MOUTH DAILY 90 tablet 1     No current facility-administered medications on file prior to visit.       Past Medical History:   Diagnosis Date    Alopecia areata     COVID-19 01/2022    Diverticulitis     Fatty liver     Gastroesophageal reflux     Hypercalcemia 07/21/2020    Hyperplastic colon polyp 2015    Kidney stones 09/21/2023    Tinea versicolor      "Tinnitus        Past Surgical History:   Procedure Laterality Date    COLONOSCOPY  2015    COLONOSCOPY  2023    Dr. Ricci. repeat 5 years    ENDOSCOPY  2023    Dr. Ricci; repeat 3 years    UPPER GASTROINTESTINAL ENDOSCOPY  2019       Family History   Problem Relation Age of Onset    Cancer Mother     Other Father         head injury    Colon cancer Neg Hx     Colon polyps Neg Hx        Social History     Socioeconomic History    Marital status:    Tobacco Use    Smoking status: Former     Current packs/day: 0.00     Average packs/day: 1 pack/day for 4.0 years (4.0 ttl pk-yrs)     Types: Cigarettes     Start date:      Quit date:      Years since quittin.2    Smokeless tobacco: Never   Vaping Use    Vaping status: Never Used   Substance and Sexual Activity    Alcohol use: Yes     Comment: 6-8 beers per week    Drug use: No    Sexual activity: Defer           The following portions of the patient's history were reviewed and updated as appropriate: problem list, allergies, current medications, past medical history, past family history, past social history, and past surgical history.    Review of Systems    Immunization History   Administered Date(s) Administered    COVID-19 (PFIZER) Purple Cap Monovalent 2021, 2021    Fluzone (or Fluarix & Flulaval for VFC) >6mos 2021, 10/24/2023    Hepatitis A 11/10/2017, 2019    Hepatitis B Adult/Adolescent IM 11/10/2017, 2017, 2019    Shingrix 2023, 2023    Tdap 2010, 2020    Zostavax 2014       Objective   Vitals:    24 0800   BP: 132/82   Weight: 132 kg (290 lb 12.8 oz)   Height: 172.7 cm (67.99\")     Body mass index is 44.23 kg/m².  Physical Exam  Vitals reviewed.   Constitutional:       Appearance: He is well-developed.   HENT:      Head: Normocephalic and atraumatic.   Cardiovascular:      Rate and Rhythm: Normal rate and regular rhythm.      Heart sounds: Normal " heart sounds, S1 normal and S2 normal.   Pulmonary:      Effort: Pulmonary effort is normal.      Breath sounds: Normal breath sounds.   Abdominal:      Palpations: There is no hepatomegaly or splenomegaly.      Tenderness: There is no abdominal tenderness.   Skin:     General: Skin is warm.   Neurological:      Mental Status: He is alert.   Psychiatric:         Behavior: Behavior normal.         Procedures    Assessment & Plan   Diagnoses and all orders for this visit:    1. Anxiety (Primary)  Comments:  He would like to stay off of SSRI    2. Essential hypertension  Comments:  upper limit nl    3. Pre-diabetes  -     Hemoglobin A1c; Future    4. Fatty liver  -     CBC & Differential  -     Ferritin  -     Comprehensive Metabolic Panel; Future    5. Hypercholesterolemia  Comments:  LDL is good.                 Reviewed cmp, flp, psa,and A1c. Get CBC today to f/u hgb. Discussed exercising 150 minutes per week, decrease portions and avoid beer.  Return in about 6 months (around 9/28/2024) for Lab Today, Lab Before FUP, Annual physical.

## 2024-03-29 LAB
BASOPHILS # BLD AUTO: 0.08 10*3/MM3 (ref 0–0.2)
BASOPHILS NFR BLD AUTO: 1.1 % (ref 0–1.5)
EOSINOPHIL # BLD AUTO: 0.3 10*3/MM3 (ref 0–0.4)
EOSINOPHIL NFR BLD AUTO: 4.3 % (ref 0.3–6.2)
ERYTHROCYTE [DISTWIDTH] IN BLOOD BY AUTOMATED COUNT: 13.1 % (ref 12.3–15.4)
FERRITIN SERPL-MCNC: 670 NG/ML (ref 30–400)
HCT VFR BLD AUTO: 34.6 % (ref 37.5–51)
HGB BLD-MCNC: 11.6 G/DL (ref 13–17.7)
IMM GRANULOCYTES # BLD AUTO: 0.03 10*3/MM3 (ref 0–0.05)
IMM GRANULOCYTES NFR BLD AUTO: 0.4 % (ref 0–0.5)
LYMPHOCYTES # BLD AUTO: 1.79 10*3/MM3 (ref 0.7–3.1)
LYMPHOCYTES NFR BLD AUTO: 25.6 % (ref 19.6–45.3)
MCH RBC QN AUTO: 32 PG (ref 26.6–33)
MCHC RBC AUTO-ENTMCNC: 33.5 G/DL (ref 31.5–35.7)
MCV RBC AUTO: 95.6 FL (ref 79–97)
MONOCYTES # BLD AUTO: 0.62 10*3/MM3 (ref 0.1–0.9)
MONOCYTES NFR BLD AUTO: 8.9 % (ref 5–12)
NEUTROPHILS # BLD AUTO: 4.16 10*3/MM3 (ref 1.7–7)
NEUTROPHILS NFR BLD AUTO: 59.7 % (ref 42.7–76)
NRBC BLD AUTO-RTO: 0 /100 WBC (ref 0–0.2)
PLATELET # BLD AUTO: 292 10*3/MM3 (ref 140–450)
RBC # BLD AUTO: 3.62 10*6/MM3 (ref 4.14–5.8)
WBC # BLD AUTO: 6.98 10*3/MM3 (ref 3.4–10.8)

## 2024-04-01 DIAGNOSIS — D64.9 ANEMIA, UNSPECIFIED TYPE: Primary | ICD-10-CM

## 2024-04-16 DIAGNOSIS — E78.00 HYPERCHOLESTEROLEMIA: Chronic | ICD-10-CM

## 2024-04-16 RX ORDER — FENOFIBRATE 145 MG/1
145 TABLET, COATED ORAL DAILY
Qty: 90 TABLET | Refills: 2 | Status: SHIPPED | OUTPATIENT
Start: 2024-04-16

## 2024-04-29 DIAGNOSIS — I10 ESSENTIAL HYPERTENSION: Chronic | ICD-10-CM

## 2024-04-29 RX ORDER — LOSARTAN POTASSIUM 100 MG/1
100 TABLET ORAL DAILY
Qty: 30 TABLET | Refills: 4 | Status: SHIPPED | OUTPATIENT
Start: 2024-04-29

## 2024-05-11 DIAGNOSIS — K22.70 BARRETT'S ESOPHAGUS WITHOUT DYSPLASIA: ICD-10-CM

## 2024-05-13 RX ORDER — OMEPRAZOLE 20 MG/1
20 CAPSULE, DELAYED RELEASE ORAL DAILY
Qty: 30 CAPSULE | Refills: 3 | Status: SHIPPED | OUTPATIENT
Start: 2024-05-13

## 2024-05-28 DIAGNOSIS — I10 ESSENTIAL HYPERTENSION: Chronic | ICD-10-CM

## 2024-05-28 RX ORDER — METOPROLOL SUCCINATE 50 MG/1
50 TABLET, EXTENDED RELEASE ORAL DAILY
Qty: 90 TABLET | Refills: 2 | Status: SHIPPED | OUTPATIENT
Start: 2024-05-28

## 2024-05-29 DIAGNOSIS — F41.9 ANXIETY: Chronic | ICD-10-CM

## 2024-05-29 DIAGNOSIS — I10 ESSENTIAL HYPERTENSION: Chronic | ICD-10-CM

## 2024-05-29 RX ORDER — AMLODIPINE BESYLATE 10 MG/1
10 TABLET ORAL DAILY
Qty: 90 TABLET | Refills: 1 | Status: SHIPPED | OUTPATIENT
Start: 2024-05-29

## 2024-05-29 RX ORDER — ESCITALOPRAM OXALATE 20 MG/1
20 TABLET ORAL DAILY
Qty: 90 TABLET | Refills: 1 | OUTPATIENT
Start: 2024-05-29

## 2024-08-26 DIAGNOSIS — E78.00 HYPERCHOLESTEROLEMIA: Chronic | ICD-10-CM

## 2024-08-26 RX ORDER — ATORVASTATIN CALCIUM 40 MG/1
40 TABLET, FILM COATED ORAL DAILY
Qty: 90 TABLET | Refills: 1 | Status: SHIPPED | OUTPATIENT
Start: 2024-08-26

## 2024-09-10 DIAGNOSIS — K22.70 BARRETT'S ESOPHAGUS WITHOUT DYSPLASIA: ICD-10-CM

## 2024-09-24 DIAGNOSIS — I10 ESSENTIAL HYPERTENSION: Chronic | ICD-10-CM

## 2024-09-24 RX ORDER — LOSARTAN POTASSIUM 100 MG/1
100 TABLET ORAL DAILY
Qty: 30 TABLET | Refills: 4 | Status: SHIPPED | OUTPATIENT
Start: 2024-09-24

## 2024-11-28 DIAGNOSIS — I10 ESSENTIAL HYPERTENSION: Chronic | ICD-10-CM

## 2024-12-02 RX ORDER — AMLODIPINE BESYLATE 10 MG/1
10 TABLET ORAL DAILY
Qty: 90 TABLET | Refills: 1 | Status: SHIPPED | OUTPATIENT
Start: 2024-12-02

## 2024-12-30 DIAGNOSIS — I10 ESSENTIAL HYPERTENSION: Chronic | ICD-10-CM

## 2024-12-30 RX ORDER — LOSARTAN POTASSIUM 100 MG/1
100 TABLET ORAL DAILY
Qty: 30 TABLET | Refills: 4 | Status: SHIPPED | OUTPATIENT
Start: 2024-12-30

## 2025-01-05 DIAGNOSIS — K22.70 BARRETT'S ESOPHAGUS WITHOUT DYSPLASIA: ICD-10-CM

## 2025-01-21 ENCOUNTER — OFFICE VISIT (OUTPATIENT)
Dept: INTERNAL MEDICINE | Facility: CLINIC | Age: 60
End: 2025-01-21
Payer: COMMERCIAL

## 2025-01-21 VITALS
SYSTOLIC BLOOD PRESSURE: 142 MMHG | BODY MASS INDEX: 43.92 KG/M2 | HEART RATE: 60 BPM | WEIGHT: 289.8 LBS | HEIGHT: 68 IN | DIASTOLIC BLOOD PRESSURE: 90 MMHG

## 2025-01-21 DIAGNOSIS — F41.9 ANXIETY: Chronic | ICD-10-CM

## 2025-01-21 DIAGNOSIS — R79.89 ELEVATED FERRITIN: ICD-10-CM

## 2025-01-21 DIAGNOSIS — E66.813 CLASS 3 OBESITY: ICD-10-CM

## 2025-01-21 DIAGNOSIS — Z00.00 WELLNESS EXAMINATION: Primary | ICD-10-CM

## 2025-01-21 DIAGNOSIS — R73.03 PRE-DIABETES: Chronic | ICD-10-CM

## 2025-01-21 DIAGNOSIS — G47.33 OSA ON CPAP: ICD-10-CM

## 2025-01-21 DIAGNOSIS — I10 ESSENTIAL HYPERTENSION: Chronic | ICD-10-CM

## 2025-01-21 PROBLEM — E66.812 CLASS 2 OBESITY DUE TO EXCESS CALORIES WITHOUT SERIOUS COMORBIDITY WITH BODY MASS INDEX (BMI) OF 36.0 TO 36.9 IN ADULT: Status: RESOLVED | Noted: 2019-12-19 | Resolved: 2025-01-21

## 2025-01-21 PROBLEM — E66.09 CLASS 2 OBESITY DUE TO EXCESS CALORIES WITHOUT SERIOUS COMORBIDITY WITH BODY MASS INDEX (BMI) OF 36.0 TO 36.9 IN ADULT: Status: RESOLVED | Noted: 2019-12-19 | Resolved: 2025-01-21

## 2025-01-21 PROCEDURE — 99396 PREV VISIT EST AGE 40-64: CPT | Performed by: INTERNAL MEDICINE

## 2025-01-21 RX ORDER — OLMESARTAN MEDOXOMIL 40 MG/1
40 TABLET ORAL DAILY
Qty: 30 TABLET | Refills: 2 | Status: SHIPPED | OUTPATIENT
Start: 2025-01-21

## 2025-01-21 NOTE — PROGRESS NOTES
Subjective        Chief Complaint   Patient presents with    Annual Exam           Kings Mera is a 59 y.o. male who presents for    Patient Active Problem List   Diagnosis    Essential hypertension    DIMAS on CPAP    Hypercholesterolemia    Numbness and tingling in left hand    Pre-diabetes    Back esophagus    Anxiety    Class 3 obesity       History of Present Illness       He uses his CPAP nightly. He has not checked his BP recently. He thinks his SBP was 140s at last check. He walks a mile sometimes. He stopped drinking in January. Denies melena, hematochezia, chest pain, dyspnea, nausea, reflux or anxiety. He feels better off etoh.  No Known Allergies    Current Outpatient Medications on File Prior to Visit   Medication Sig Dispense Refill    amLODIPine (NORVASC) 10 MG tablet TAKE 1 TABLET BY MOUTH DAILY 90 tablet 1    atorvastatin (LIPITOR) 40 MG tablet TAKE 1 TABLET BY MOUTH DAILY 90 tablet 1    fenofibrate (TRICOR) 145 MG tablet TAKE 1 TABLET BY MOUTH DAILY 90 tablet 2    metoprolol succinate XL (TOPROL-XL) 50 MG 24 hr tablet Take 1 tablet by mouth Daily. 90 tablet 2    Omega-3 Fatty Acids (FISH OIL) 1000 MG capsule capsule Take  by mouth Daily With Breakfast.      omeprazole (priLOSEC) 20 MG capsule TAKE 1 CAPSULE BY MOUTH DAILY 30 capsule 3    [DISCONTINUED] doxycycline (MONODOX) 100 MG capsule Take one capsule po bid for 10 days 20 capsule 0    [DISCONTINUED] losartan (COZAAR) 100 MG tablet Take 1 tablet by mouth Daily. 30 tablet 4    [DISCONTINUED] fluticasone (FLONASE) 50 MCG/ACT nasal spray Administer 2 sprays into the nostril(s) as directed by provider Daily for 30 days. 16 g 0    [DISCONTINUED] methylPREDNISolone (MEDROL) 4 MG dose pack Take as directed on package instructions. (Patient not taking: Reported on 1/21/2025) 21 tablet 0     No current facility-administered medications on file prior to visit.     Class 3 Severe Obesity (BMI >=40). Obesity-related health conditions include the  following: obstructive sleep apnea, hypertension, impaired fasting glucose, and dyslipidemias. Obesity is worsening. BMI is is above average; BMI management plan is completed. We discussed portion control and increasing exercise.   Past Medical History:   Diagnosis Date    Alopecia areata     COVID-19 2022    Diverticulitis     Fatty liver     Gastroesophageal reflux     Hypercalcemia 2020    Hyperplastic colon polyp 2015    Kidney stones 2023    Tinea versicolor     Tinnitus        Past Surgical History:   Procedure Laterality Date    COLONOSCOPY  2015    COLONOSCOPY  2023    Dr. Ricci. repeat 5 years    ENDOSCOPY  2023    Dr. Ricci; repeat 3 years    UPPER GASTROINTESTINAL ENDOSCOPY  2019       Family History   Problem Relation Age of Onset    Cancer Mother         brain    Colon cancer Neg Hx     Colon polyps Neg Hx        Social History     Socioeconomic History    Marital status:    Tobacco Use    Smoking status: Former     Current packs/day: 0.00     Average packs/day: 1 pack/day for 4.0 years (4.0 ttl pk-yrs)     Types: Cigarettes     Start date:      Quit date:      Years since quittin.0     Passive exposure: Past    Smokeless tobacco: Never   Vaping Use    Vaping status: Never Used   Substance and Sexual Activity    Alcohol use: Not Currently    Drug use: No    Sexual activity: Defer           The following portions of the patient's history were reviewed and updated as appropriate: problem list, allergies, current medications, past medical history, past family history, past social history, and past surgical history.    Review of Systems    Immunization History   Administered Date(s) Administered    COVID-19 (PFIZER) Purple Cap Monovalent 2021, 2021    Fluzone (or Fluarix & Flulaval for VFC) >6mos 2021, 10/24/2023    Hepatitis A 11/10/2017, 2019    Hepatitis B Adult/Adolescent IM 11/10/2017, 2017, 2019    Shingrix  "02/27/2023, 07/31/2023    Tdap 01/01/2010, 02/03/2020    Zostavax 07/11/2014       Objective   Vitals:    01/21/25 1240   BP: 142/90   Pulse: 60   Weight: 131 kg (289 lb 12.8 oz)   Height: 172.7 cm (67.99\")     Body mass index is 44.07 kg/m².  Physical Exam  Vitals reviewed.   Constitutional:       Appearance: He is well-developed.   HENT:      Head: Normocephalic and atraumatic.      Mouth/Throat:      Mouth: Mucous membranes are moist.      Pharynx: Oropharynx is clear.   Eyes:      Extraocular Movements: Extraocular movements intact.      Conjunctiva/sclera: Conjunctivae normal.      Pupils: Pupils are equal, round, and reactive to light.   Neck:      Thyroid: No thyromegaly.      Vascular: No carotid bruit.   Cardiovascular:      Rate and Rhythm: Normal rate and regular rhythm.      Heart sounds: Normal heart sounds. No murmur heard.  Pulmonary:      Effort: Pulmonary effort is normal.      Breath sounds: Normal breath sounds.   Abdominal:      General: There is no distension.      Palpations: Abdomen is soft. There is no mass.      Tenderness: There is no abdominal tenderness. There is no rebound.   Musculoskeletal:      Cervical back: Neck supple.   Lymphadenopathy:      Cervical: No cervical adenopathy.   Skin:     General: Skin is warm.   Neurological:      Mental Status: He is alert.   Psychiatric:         Behavior: Behavior normal.         Procedures    Assessment & Plan   Diagnoses and all orders for this visit:    1. Wellness examination (Primary)    2. Essential hypertension  -     Comprehensive Metabolic Panel  -     olmesartan (Benicar) 40 MG tablet; Take 1 tablet by mouth Daily.  Dispense: 30 tablet; Refill: 2    3. Pre-diabetes  -     Hemoglobin A1c    4. Anxiety  Comments:  Doing well off of meds    5. DIMAS on CPAP  Comments:  uses CPAP    6. Class 3 obesity    7. Elevated ferritin  -     CBC & Differential  -     Iron and TIBC  -     Ferritin      Declines flu shot. Cscope is UTD. Change losartan " to benicar. [facility] exercise 150 to 300 minutes per week.  Labs today.             Return in about 4 weeks (around 2/18/2025) for Lab Today.

## 2025-01-22 DIAGNOSIS — E78.00 HYPERCHOLESTEROLEMIA: Chronic | ICD-10-CM

## 2025-01-22 LAB
ALBUMIN SERPL-MCNC: 4.4 G/DL (ref 3.5–5.2)
ALBUMIN/GLOB SERPL: 1.6 G/DL
ALP SERPL-CCNC: 55 U/L (ref 39–117)
ALT SERPL-CCNC: 24 U/L (ref 1–41)
AST SERPL-CCNC: 35 U/L (ref 1–40)
BASOPHILS # BLD AUTO: 0.12 10*3/MM3 (ref 0–0.2)
BASOPHILS NFR BLD AUTO: 1.3 % (ref 0–1.5)
BILIRUB SERPL-MCNC: 0.6 MG/DL (ref 0–1.2)
BUN SERPL-MCNC: 11 MG/DL (ref 8–23)
BUN/CREAT SERPL: 13.6 (ref 7–25)
CALCIUM SERPL-MCNC: 10.1 MG/DL (ref 8.6–10.5)
CHLORIDE SERPL-SCNC: 103 MMOL/L (ref 98–107)
CO2 SERPL-SCNC: 27.5 MMOL/L (ref 22–29)
CREAT SERPL-MCNC: 0.81 MG/DL (ref 0.76–1.27)
EGFRCR SERPLBLD CKD-EPI 2021: 100.3 ML/MIN/1.73
EOSINOPHIL # BLD AUTO: 0.29 10*3/MM3 (ref 0–0.4)
EOSINOPHIL NFR BLD AUTO: 3.2 % (ref 0.3–6.2)
ERYTHROCYTE [DISTWIDTH] IN BLOOD BY AUTOMATED COUNT: 12 % (ref 12.3–15.4)
FERRITIN SERPL-MCNC: 626 NG/ML (ref 30–400)
GLOBULIN SER CALC-MCNC: 2.8 GM/DL
GLUCOSE SERPL-MCNC: 90 MG/DL (ref 65–99)
HBA1C MFR BLD: 6 % (ref 4.8–5.6)
HCT VFR BLD AUTO: 39.2 % (ref 37.5–51)
HGB BLD-MCNC: 12.4 G/DL (ref 13–17.7)
IMM GRANULOCYTES # BLD AUTO: 0.02 10*3/MM3 (ref 0–0.05)
IMM GRANULOCYTES NFR BLD AUTO: 0.2 % (ref 0–0.5)
IRON SATN MFR SERPL: 11 % (ref 20–50)
IRON SERPL-MCNC: 60 MCG/DL (ref 59–158)
LYMPHOCYTES # BLD AUTO: 2.59 10*3/MM3 (ref 0.7–3.1)
LYMPHOCYTES NFR BLD AUTO: 28.9 % (ref 19.6–45.3)
MCH RBC QN AUTO: 29.1 PG (ref 26.6–33)
MCHC RBC AUTO-ENTMCNC: 31.6 G/DL (ref 31.5–35.7)
MCV RBC AUTO: 92 FL (ref 79–97)
MONOCYTES # BLD AUTO: 0.79 10*3/MM3 (ref 0.1–0.9)
MONOCYTES NFR BLD AUTO: 8.8 % (ref 5–12)
NEUTROPHILS # BLD AUTO: 5.16 10*3/MM3 (ref 1.7–7)
NEUTROPHILS NFR BLD AUTO: 57.6 % (ref 42.7–76)
NRBC BLD AUTO-RTO: 0 /100 WBC (ref 0–0.2)
PLATELET # BLD AUTO: 378 10*3/MM3 (ref 140–450)
POTASSIUM SERPL-SCNC: 4.5 MMOL/L (ref 3.5–5.2)
PROT SERPL-MCNC: 7.2 G/DL (ref 6–8.5)
RBC # BLD AUTO: 4.26 10*6/MM3 (ref 4.14–5.8)
SODIUM SERPL-SCNC: 144 MMOL/L (ref 136–145)
TIBC SERPL-MCNC: 539 MCG/DL
UIBC SERPL-MCNC: 479 MCG/DL (ref 112–346)
WBC # BLD AUTO: 8.97 10*3/MM3 (ref 3.4–10.8)

## 2025-01-22 RX ORDER — FENOFIBRATE 145 MG/1
145 TABLET, COATED ORAL DAILY
Qty: 90 TABLET | Refills: 2 | Status: SHIPPED | OUTPATIENT
Start: 2025-01-22

## 2025-02-18 ENCOUNTER — OFFICE VISIT (OUTPATIENT)
Dept: INTERNAL MEDICINE | Facility: CLINIC | Age: 60
End: 2025-02-18
Payer: COMMERCIAL

## 2025-02-18 VITALS
SYSTOLIC BLOOD PRESSURE: 120 MMHG | BODY MASS INDEX: 42.01 KG/M2 | HEIGHT: 68 IN | DIASTOLIC BLOOD PRESSURE: 78 MMHG | WEIGHT: 277.2 LBS

## 2025-02-18 DIAGNOSIS — I10 ESSENTIAL HYPERTENSION: Chronic | ICD-10-CM

## 2025-02-18 DIAGNOSIS — E78.00 HYPERCHOLESTEROLEMIA: Chronic | ICD-10-CM

## 2025-02-18 DIAGNOSIS — Z12.5 PROSTATE CANCER SCREENING: ICD-10-CM

## 2025-02-18 DIAGNOSIS — R73.03 PRE-DIABETES: Primary | Chronic | ICD-10-CM

## 2025-02-18 PROCEDURE — 99214 OFFICE O/P EST MOD 30 MIN: CPT | Performed by: INTERNAL MEDICINE

## 2025-02-18 NOTE — PROGRESS NOTES
Subjective        Chief Complaint   Patient presents with    Hypertension           Kings Mera is a 59 y.o. male who presents for    Patient Active Problem List   Diagnosis    Essential hypertension    DIMAS on CPAP    Hypercholesterolemia    Numbness and tingling in left hand    Pre-diabetes    Back esophagus    Anxiety    Class 3 obesity       History of Present Illness     His BP has been 124/80. Denies chest pain or dyspnea. He has lost 12 pounds in a month. He has decreased etoh. He is doing a weekly injection of semaglutide through [facility].    No Known Allergies    Current Outpatient Medications on File Prior to Visit   Medication Sig Dispense Refill    amLODIPine (NORVASC) 10 MG tablet TAKE 1 TABLET BY MOUTH DAILY 90 tablet 1    atorvastatin (LIPITOR) 40 MG tablet TAKE 1 TABLET BY MOUTH DAILY 90 tablet 1    fenofibrate (TRICOR) 145 MG tablet TAKE 1 TABLET BY MOUTH DAILY 90 tablet 2    metoprolol succinate XL (TOPROL-XL) 50 MG 24 hr tablet Take 1 tablet by mouth Daily. 90 tablet 2    olmesartan (Benicar) 40 MG tablet Take 1 tablet by mouth Daily. 30 tablet 2    Omega-3 Fatty Acids (FISH OIL) 1000 MG capsule capsule Take  by mouth Daily With Breakfast.      omeprazole (priLOSEC) 20 MG capsule TAKE 1 CAPSULE BY MOUTH DAILY 30 capsule 3    SEMAGLUTIDE-WEIGHT MANAGEMENT SC Inject  under the skin into the appropriate area as directed.       No current facility-administered medications on file prior to visit.       Past Medical History:   Diagnosis Date    Alopecia areata     COVID-19 01/2022    Diverticulitis     Fatty liver     Gastroesophageal reflux     Hypercalcemia 07/21/2020    Hyperplastic colon polyp 2015    Kidney stones 09/21/2023    Tinea versicolor     Tinnitus        Past Surgical History:   Procedure Laterality Date    COLONOSCOPY  08/28/2015    COLONOSCOPY  01/13/2023    Dr. Ricci. repeat 5 years    ENDOSCOPY  01/13/2023    Dr. Ricci; repeat 3 years    UPPER GASTROINTESTINAL ENDOSCOPY  2019  "      Family History   Problem Relation Age of Onset    Cancer Mother         brain    Colon cancer Neg Hx     Colon polyps Neg Hx        Social History     Socioeconomic History    Marital status:    Tobacco Use    Smoking status: Former     Current packs/day: 0.00     Average packs/day: 1 pack/day for 4.0 years (4.0 ttl pk-yrs)     Types: Cigarettes     Start date:      Quit date:      Years since quittin.1     Passive exposure: Past    Smokeless tobacco: Never   Vaping Use    Vaping status: Never Used   Substance and Sexual Activity    Alcohol use: Not Currently    Drug use: No    Sexual activity: Defer           The following portions of the patient's history were reviewed and updated as appropriate: problem list, allergies, current medications, past medical history, past family history, past social history, and past surgical history.    Review of Systems    Immunization History   Administered Date(s) Administered    COVID-19 (PFIZER) Purple Cap Monovalent 2021, 2021    Fluzone (or Fluarix & Flulaval for VFC) >6mos 2021, 10/24/2023    Hepatitis A 11/10/2017, 2019    Hepatitis B Adult/Adolescent IM 11/10/2017, 2017, 2019    Shingrix 2023, 2023    Tdap 2010, 2020    Zostavax 2014       Objective   Vitals:    25 1338   BP: 120/78   Weight: 126 kg (277 lb 3.2 oz)   Height: 172.7 cm (67.99\")     Body mass index is 42.16 kg/m².  Physical Exam  Vitals reviewed.   Constitutional:       Appearance: He is well-developed.   HENT:      Head: Normocephalic and atraumatic.   Cardiovascular:      Rate and Rhythm: Normal rate and regular rhythm.      Heart sounds: Normal heart sounds, S1 normal and S2 normal.   Pulmonary:      Effort: Pulmonary effort is normal.      Breath sounds: Normal breath sounds.   Skin:     General: Skin is warm.   Neurological:      Mental Status: He is alert.   Psychiatric:         Behavior: Behavior normal. "         Procedures    Assessment & Plan   Diagnoses and all orders for this visit:    1. Pre-diabetes (Primary)  -     Hemoglobin A1c; Future    2. Hypercholesterolemia  -     Lipid Panel With / Chol / HDL Ratio; Future    3. Essential hypertension  -     Comprehensive Metabolic Panel; Future  -     CBC & Differential; Future    4. Prostate cancer screening  -     PSA Screen; Future        BP is good. He is losing wt with semaglutide. He will monitor for dizziness with wt loss.           Return in about 4 months (around 6/18/2025) for Lab Before FUP.

## 2025-02-26 DIAGNOSIS — K22.70 BARRETT'S ESOPHAGUS WITHOUT DYSPLASIA: ICD-10-CM

## 2025-02-26 DIAGNOSIS — I10 ESSENTIAL HYPERTENSION: Chronic | ICD-10-CM

## 2025-02-26 RX ORDER — METOPROLOL SUCCINATE 50 MG/1
50 TABLET, EXTENDED RELEASE ORAL DAILY
Qty: 90 TABLET | Refills: 2 | Status: SHIPPED | OUTPATIENT
Start: 2025-02-26

## 2025-02-27 RX ORDER — OMEPRAZOLE 20 MG/1
20 CAPSULE, DELAYED RELEASE ORAL DAILY
Qty: 30 CAPSULE | Refills: 3 | Status: SHIPPED | OUTPATIENT
Start: 2025-02-27

## 2025-03-11 DIAGNOSIS — K22.70 BARRETT'S ESOPHAGUS WITHOUT DYSPLASIA: ICD-10-CM

## 2025-03-11 DIAGNOSIS — E78.00 HYPERCHOLESTEROLEMIA: Chronic | ICD-10-CM

## 2025-03-11 RX ORDER — ATORVASTATIN CALCIUM 40 MG/1
40 TABLET, FILM COATED ORAL DAILY
Qty: 90 TABLET | Refills: 1 | Status: SHIPPED | OUTPATIENT
Start: 2025-03-11

## 2025-03-11 RX ORDER — OMEPRAZOLE 20 MG/1
20 CAPSULE, DELAYED RELEASE ORAL DAILY
Qty: 90 CAPSULE | Refills: 3 | Status: SHIPPED | OUTPATIENT
Start: 2025-03-11

## 2025-03-14 DIAGNOSIS — Z12.5 PROSTATE CANCER SCREENING: ICD-10-CM

## 2025-03-14 DIAGNOSIS — K76.0 FATTY LIVER: ICD-10-CM

## 2025-03-14 DIAGNOSIS — E78.00 HYPERCHOLESTEROLEMIA: Primary | ICD-10-CM

## 2025-03-14 DIAGNOSIS — R73.03 PRE-DIABETES: ICD-10-CM

## 2025-03-14 DIAGNOSIS — I10 ESSENTIAL HYPERTENSION: ICD-10-CM

## 2025-03-20 ENCOUNTER — OFFICE VISIT (OUTPATIENT)
Dept: SLEEP MEDICINE | Facility: HOSPITAL | Age: 60
End: 2025-03-20
Payer: COMMERCIAL

## 2025-03-20 VITALS
HEART RATE: 71 BPM | BODY MASS INDEX: 41.07 KG/M2 | SYSTOLIC BLOOD PRESSURE: 120 MMHG | WEIGHT: 271 LBS | DIASTOLIC BLOOD PRESSURE: 78 MMHG | HEIGHT: 68 IN | OXYGEN SATURATION: 95 %

## 2025-03-20 DIAGNOSIS — E66.813 CLASS 3 OBESITY: ICD-10-CM

## 2025-03-20 DIAGNOSIS — G47.33 OSA ON CPAP: Primary | ICD-10-CM

## 2025-03-20 PROCEDURE — G0463 HOSPITAL OUTPT CLINIC VISIT: HCPCS

## 2025-03-20 PROCEDURE — 99213 OFFICE O/P EST LOW 20 MIN: CPT | Performed by: INTERNAL MEDICINE

## 2025-03-20 NOTE — PROGRESS NOTES
"  [facility]  Sleep medicine  [address]  [address]  [place] [address]  Phone   Fax       SLEEP CLINIC FOLLOW UP PROGRESS NOTE.    Kings Mera  [id number]   1965  59 y.o.  male      PCP: Moncho Gonzalez MD      Date of visit: 3/20/2025    Chief Complaint   Patient presents with    Sleep Apnea    Obesity       HPI:  This is a 59 y.o. years old patient is here for the management of obstructive sleep apnea.  Sleep apnea is mild in severity with a AHI of 9/hr. Patient is using positive airway pressure therapy with auto CPAP and the symptoms of sleep apnea have improved significantly on the therapy. Normally patient goes to bed at 9 PM and wakes up at 6 AM .  The patient wakes up 3 time(s) during the night and has no problem going back to sleep.  Feels refreshed after waking up.  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  He has lost about 30 pounds    Medications and allergies are reviewed by me and documented in the encounter.     SOCIAL (habits pertaining to sleep medicine)  History tobacco use:No   History of alcohol use: 10 per week  Caffeine use: 3     REVIEW OF SYSTEMS:   Pertaining positive symptoms are:  [place] Sleepiness Scale :Total score: 1   Anxiety      PHYSICAL EXAMINATION:  CONSTITUTIONAL:  Vitals:    03/20/25 0756   BP: 120/78   Pulse: 71   SpO2: 95%   Weight: 123 kg (271 lb)   Height: 172.7 cm (67.99\")    Body mass index is 41.22 kg/m².   NOSE: nasal passages are clear, No deformities noted   RESP SYSTEM: Not in any respiratory distress, no chest deformities noted,   CARDIOVASULAR: No edema noted  NEURO: Oriented x 3, gait normal,  Mood and affect appeared appropriate      Data reviewed:  The Smart card downloaded on 3/20/2025 has been reviewed independently by me for compliance and discussed the data with the patient.   Compliance; 100%  More than 4 hr use, 100%  Average use of the " device 12 hours per night  Residual AHI: 0.5 /hr (goal < 5.0 /hr)  Mask type: Nasal pillows  Device: ResMed  DME: [employer]  P95 pressure is 11.9 down from 15.6 after weight loss of 30 pounds      ASSESSMENT AND PLAN:  Obstructive sleep apnea ( G 47.33).  The symptoms of sleep apnea have improved with the device and the treatment.  Patient's compliance with the device is excellent for treatment of sleep apnea.  I have independently reviewed the smart card down load and discussed with the patient the download data and encouarged the patient to continue to use the device.The residual AHI is acceptable. The device is benefiting the patient and the device is medically necessary.  Without proper control of sleep apnea and good compliance there is a increased risk for hypertension, diabetes mellitus and nonrestorative sleep with hypersomnia which can increase risk for motor vehicle accidents.  Untreated sleep apnea is also a risk factor for development of atrial fibrillation, pulmonary hypertension, insulin resistance and stroke. The patient is also instructed to get the supplies from the DME [employer] and and change them on a regular basis.  A prescription for supplies has been sent to the DME [employer].  I have also discussed the good sleep hygiene habits and adequate amount of sleep needed for good health.  Obesity  3 with BMI is Body mass index is 41.22 kg/m².. I have discuss the relationship between the weight and sleep apnea. The benefit of weight loss in reducing severity of sleep apnea was discussed. Discussed diet and exercise with the patient to achieve ideal BMI.  He has lost about 30 pounds in weight and wants to continue to lose weight.  Return in about 1 year (around 3/20/2026) for with smart card down load. . Patient's questions were answered.    3/20/2025  Lg Estraad MD  Sleep Medicine.  Medical Director,   [facility] sleep centers.

## 2025-03-27 DIAGNOSIS — I10 ESSENTIAL HYPERTENSION: Chronic | ICD-10-CM

## 2025-03-27 RX ORDER — OLMESARTAN MEDOXOMIL 40 MG/1
40 TABLET ORAL DAILY
Qty: 30 TABLET | Refills: 2 | Status: SHIPPED | OUTPATIENT
Start: 2025-03-27

## 2025-03-29 DIAGNOSIS — I10 ESSENTIAL HYPERTENSION: Chronic | ICD-10-CM

## 2025-03-31 RX ORDER — LOSARTAN POTASSIUM 100 MG/1
100 TABLET ORAL DAILY
Qty: 90 TABLET | OUTPATIENT
Start: 2025-03-31

## 2025-04-03 DIAGNOSIS — I10 ESSENTIAL HYPERTENSION: Chronic | ICD-10-CM

## 2025-04-03 RX ORDER — LOSARTAN POTASSIUM 100 MG/1
100 TABLET ORAL DAILY
Qty: 90 TABLET | OUTPATIENT
Start: 2025-04-03

## 2025-04-05 DIAGNOSIS — I10 ESSENTIAL HYPERTENSION: Chronic | ICD-10-CM

## 2025-04-07 RX ORDER — LOSARTAN POTASSIUM 100 MG/1
100 TABLET ORAL DAILY
Qty: 90 TABLET | OUTPATIENT
Start: 2025-04-07

## 2025-04-07 NOTE — TELEPHONE ENCOUNTER
Pt has confirmed taking olmesartan; and he has talked to pharmacy a few days ago to dc the losartan

## 2025-05-20 ENCOUNTER — OFFICE VISIT (OUTPATIENT)
Dept: INTERNAL MEDICINE | Facility: CLINIC | Age: 60
End: 2025-05-20
Payer: COMMERCIAL

## 2025-05-20 VITALS
BODY MASS INDEX: 38.28 KG/M2 | WEIGHT: 252.6 LBS | DIASTOLIC BLOOD PRESSURE: 80 MMHG | SYSTOLIC BLOOD PRESSURE: 108 MMHG | HEIGHT: 68 IN

## 2025-05-20 DIAGNOSIS — G47.33 OSA ON CPAP: Chronic | ICD-10-CM

## 2025-05-20 DIAGNOSIS — I10 ESSENTIAL HYPERTENSION: Primary | Chronic | ICD-10-CM

## 2025-05-20 DIAGNOSIS — E66.813 CLASS 3 OBESITY: ICD-10-CM

## 2025-05-20 PROCEDURE — 99213 OFFICE O/P EST LOW 20 MIN: CPT | Performed by: INTERNAL MEDICINE

## 2025-05-20 NOTE — PROGRESS NOTES
Subjective        Chief Complaint   Patient presents with    Hypertension           Kings Mera is a 59 y.o. male who presents for    Patient Active Problem List   Diagnosis    Essential hypertension    DIMAS on CPAP    Hypercholesterolemia    Numbness and tingling in left hand    Pre-diabetes    Back esophagus    Anxiety    Class 3 obesity       History of Present Illness     His BP has been 116/60. He can be light headed with bending over. Denies chest pain or dyspnea. He is not consuming etoh. He is not exercising. He has lost 50 pounds with semaglutide.    No Known Allergies    Current Outpatient Medications on File Prior to Visit   Medication Sig Dispense Refill    amLODIPine (NORVASC) 10 MG tablet TAKE 1 TABLET BY MOUTH DAILY 90 tablet 1    atorvastatin (LIPITOR) 40 MG tablet Take 1 tablet by mouth Daily. 90 tablet 1    fenofibrate (TRICOR) 145 MG tablet TAKE 1 TABLET BY MOUTH DAILY 90 tablet 2    olmesartan (BENICAR) 40 MG tablet TAKE 1 TABLET BY MOUTH DAILY 30 tablet 2    Omega-3 Fatty Acids (FISH OIL) 1000 MG capsule capsule Take  by mouth Daily With Breakfast.      omeprazole (priLOSEC) 20 MG capsule Take 1 capsule by mouth Daily. 90 capsule 3    SEMAGLUTIDE-WEIGHT MANAGEMENT SC Inject  under the skin into the appropriate area as directed.      [DISCONTINUED] metoprolol succinate XL (TOPROL-XL) 50 MG 24 hr tablet TAKE 1 TABLET BY MOUTH DAILY 90 tablet 2     No current facility-administered medications on file prior to visit.       Past Medical History:   Diagnosis Date    Alopecia areata     COVID-19 01/2022    Diverticulitis     Fatty liver     Gastroesophageal reflux     Hypercalcemia 07/21/2020    Hyperplastic colon polyp 2015    Kidney stones 09/21/2023    Tinea versicolor     Tinnitus        Past Surgical History:   Procedure Laterality Date    COLONOSCOPY  08/28/2015    COLONOSCOPY  01/13/2023    Dr. Ricci. repeat 5 years    ENDOSCOPY  01/13/2023    Dr. Ricci; repeat 3 years    UPPER  "GASTROINTESTINAL ENDOSCOPY  2019       Family History   Problem Relation Age of Onset    Cancer Mother         brain    Colon cancer Neg Hx     Colon polyps Neg Hx        Social History     Socioeconomic History    Marital status:    Tobacco Use    Smoking status: Former     Current packs/day: 0.00     Average packs/day: 1 pack/day for 4.0 years (4.0 ttl pk-yrs)     Types: Cigarettes     Start date:      Quit date:      Years since quittin.4     Passive exposure: Past    Smokeless tobacco: Never   Vaping Use    Vaping status: Never Used   Substance and Sexual Activity    Alcohol use: Not Currently    Drug use: No    Sexual activity: Defer           The following portions of the patient's history were reviewed and updated as appropriate: problem list, allergies, current medications, past medical history, past family history, past social history, and past surgical history.    Review of Systems    Immunization History   Administered Date(s) Administered    COVID-19 (PFIZER) Purple Cap Monovalent 2021, 2021    Fluzone (or Fluarix & Flulaval for VFC) >6mos 2021, 10/24/2023    Hepatitis A 11/10/2017, 2019    Hepatitis B Adult/Adolescent IM 11/10/2017, 2017, 2019    Shingrix 2023, 2023    Tdap 2010, 2020    Zostavax 2014       Objective   Vitals:    25 1022   BP: 108/80   Weight: 115 kg (252 lb 9.6 oz)   Height: 172.7 cm (67.99\")     Body mass index is 38.42 kg/m².  Physical Exam  Vitals reviewed.   Constitutional:       Appearance: He is well-developed.   HENT:      Head: Normocephalic and atraumatic.   Cardiovascular:      Rate and Rhythm: Normal rate and regular rhythm.      Heart sounds: Normal heart sounds, S1 normal and S2 normal.   Pulmonary:      Effort: Pulmonary effort is normal.      Breath sounds: Normal breath sounds.   Skin:     General: Skin is warm.   Neurological:      Mental Status: He is alert.   Psychiatric:    "      Behavior: Behavior normal.         Procedures    Assessment & Plan   Diagnoses and all orders for this visit:    1. Essential hypertension (Primary)  Comments:  wean off toprol since BP lower with wt loss    2. DIMAS on CPAP  Comments:  uses nightly    3. Class 3 obesity  Comments:  losing wt with semaglutide        Doing great job with wt loss. He will wean off toprol over a week given BP is lower. Discussed walking 150-300 min per week.    He has an appt with labs in June with me.           No follow-ups on file.

## 2025-05-29 DIAGNOSIS — I10 ESSENTIAL HYPERTENSION: Chronic | ICD-10-CM

## 2025-05-29 RX ORDER — AMLODIPINE BESYLATE 10 MG/1
10 TABLET ORAL DAILY
Qty: 90 TABLET | Refills: 1 | Status: SHIPPED | OUTPATIENT
Start: 2025-05-29

## 2025-06-24 ENCOUNTER — OFFICE VISIT (OUTPATIENT)
Dept: INTERNAL MEDICINE | Facility: CLINIC | Age: 60
End: 2025-06-24
Payer: COMMERCIAL

## 2025-06-24 VITALS
HEIGHT: 68 IN | DIASTOLIC BLOOD PRESSURE: 76 MMHG | WEIGHT: 256.2 LBS | SYSTOLIC BLOOD PRESSURE: 110 MMHG | BODY MASS INDEX: 38.83 KG/M2

## 2025-06-24 DIAGNOSIS — K76.0 FATTY LIVER: ICD-10-CM

## 2025-06-24 DIAGNOSIS — E78.00 HYPERCHOLESTEROLEMIA: Primary | Chronic | ICD-10-CM

## 2025-06-24 DIAGNOSIS — I10 ESSENTIAL HYPERTENSION: Chronic | ICD-10-CM

## 2025-06-24 DIAGNOSIS — R73.03 PRE-DIABETES: Chronic | ICD-10-CM

## 2025-06-24 PROCEDURE — 99214 OFFICE O/P EST MOD 30 MIN: CPT | Performed by: INTERNAL MEDICINE

## 2025-06-24 NOTE — PROGRESS NOTES
Subjective        Chief Complaint   Patient presents with    Hypertension           Kings Mera is a 59 y.o. male who presents for    Patient Active Problem List   Diagnosis    Essential hypertension    DIMAS on CPAP    Hypercholesterolemia    Numbness and tingling in left hand    Pre-diabetes    Back esophagus    Anxiety    Class 3 obesity       History of Present Illness        His SBP has been up to 160. He says it usually runs in the 140s. He is taking semaglutide through [facility]. He has not been walking as much with the heat. He gets up 2 times per night and he is able to fall back asleep. He has decreased his alcohol to 12 drinks per week.   No Known Allergies    Current Outpatient Medications on File Prior to Visit   Medication Sig Dispense Refill    amLODIPine (NORVASC) 10 MG tablet TAKE 1 TABLET BY MOUTH DAILY 90 tablet 1    atorvastatin (LIPITOR) 40 MG tablet Take 1 tablet by mouth Daily. 90 tablet 1    fenofibrate (TRICOR) 145 MG tablet TAKE 1 TABLET BY MOUTH DAILY 90 tablet 2    olmesartan (BENICAR) 40 MG tablet TAKE 1 TABLET BY MOUTH DAILY 30 tablet 2    Omega-3 Fatty Acids (FISH OIL) 1000 MG capsule capsule Take  by mouth Daily With Breakfast.      omeprazole (priLOSEC) 20 MG capsule Take 1 capsule by mouth Daily. 90 capsule 3    SEMAGLUTIDE-WEIGHT MANAGEMENT SC Inject  under the skin into the appropriate area as directed.       No current facility-administered medications on file prior to visit.       Past Medical History:   Diagnosis Date    Alopecia areata     COVID-19 01/2022    Diverticulitis     Fatty liver     Gastroesophageal reflux     Hypercalcemia 07/21/2020    Hyperplastic colon polyp 2015    Kidney stones 09/21/2023    Tinea versicolor     Tinnitus        Past Surgical History:   Procedure Laterality Date    COLONOSCOPY  08/28/2015    COLONOSCOPY  01/13/2023    Dr. Ricci. repeat 5 years    ENDOSCOPY  01/13/2023    Dr. Ricci; repeat 3 years    UPPER GASTROINTESTINAL ENDOSCOPY  2019  "      Family History   Problem Relation Age of Onset    Cancer Mother         brain    Colon cancer Neg Hx     Colon polyps Neg Hx        Social History     Socioeconomic History    Marital status:    Tobacco Use    Smoking status: Former     Current packs/day: 0.00     Average packs/day: 1 pack/day for 4.0 years (4.0 ttl pk-yrs)     Types: Cigarettes     Start date:      Quit date:      Years since quittin.5     Passive exposure: Past    Smokeless tobacco: Never   Vaping Use    Vaping status: Never Used   Substance and Sexual Activity    Alcohol use: Not Currently    Drug use: No    Sexual activity: Defer           The following portions of the patient's history were reviewed and updated as appropriate: problem list, allergies, current medications, past medical history, past family history, past social history, and past surgical history.    Review of Systems    Immunization History   Administered Date(s) Administered    COVID-19 (PFIZER) Purple Cap Monovalent 2021, 2021    Fluzone (or Fluarix & Flulaval for VFC) >6mos 2021, 10/24/2023    Hepatitis A 11/10/2017, 2019    Hepatitis B Adult/Adolescent IM 11/10/2017, 2017, 2019    Shingrix 2023, 2023    Tdap 2010, 2020    Zostavax 2014       Objective   Vitals:    25 1024   BP: 110/76   Weight: 116 kg (256 lb 3.2 oz)   Height: 172.7 cm (67.99\")     Body mass index is 38.96 kg/m².  Physical Exam  Vitals reviewed.   Constitutional:       Appearance: He is well-developed.   HENT:      Head: Normocephalic and atraumatic.   Cardiovascular:      Rate and Rhythm: Normal rate and regular rhythm.      Heart sounds: Normal heart sounds, S1 normal and S2 normal.   Pulmonary:      Effort: Pulmonary effort is normal.      Breath sounds: Normal breath sounds.   Skin:     General: Skin is warm.   Neurological:      Mental Status: He is alert.   Psychiatric:         Behavior: Behavior normal. "         Procedures    Assessment & Plan   Diagnoses and all orders for this visit:    1. Hypercholesterolemia (Primary)  Comments:  LDL is stable    2. Essential hypertension  Comments:  BP is good. See below    3. Pre-diabetes  -     Hemoglobin A1c; Future    4. Fatty liver  Comments:  Discussed decreasing etoh.  Orders:  -     Comprehensive Metabolic Panel; Future               Reviewed cmp, flp, cbc, tsh, A1c and PSA. Discussed exercising 150 min per week. BP is good today. He has not been sitting for 5 minutes before checking; discussed proper technique. Discussed decrease etoh.    Return in about 5 months (around 11/24/2025) for Lab Before FUP.

## 2025-06-25 DIAGNOSIS — I10 ESSENTIAL HYPERTENSION: Chronic | ICD-10-CM

## 2025-06-25 RX ORDER — OLMESARTAN MEDOXOMIL 40 MG/1
40 TABLET ORAL DAILY
Qty: 90 TABLET | Refills: 2 | Status: SHIPPED | OUTPATIENT
Start: 2025-06-25